# Patient Record
Sex: FEMALE | Race: WHITE | Employment: FULL TIME | URBAN - METROPOLITAN AREA
[De-identification: names, ages, dates, MRNs, and addresses within clinical notes are randomized per-mention and may not be internally consistent; named-entity substitution may affect disease eponyms.]

---

## 2017-05-23 ENCOUNTER — NURSE ONLY (OUTPATIENT)
Dept: FAMILY MEDICINE CLINIC | Age: 57
End: 2017-05-23

## 2017-05-23 DIAGNOSIS — E78.5 HYPERLIPIDEMIA, UNSPECIFIED HYPERLIPIDEMIA TYPE: ICD-10-CM

## 2017-05-23 LAB
ALBUMIN SERPL-MCNC: 4.6 G/DL (ref 3.4–5)
ALP BLD-CCNC: 68 U/L (ref 40–129)
ALT SERPL-CCNC: 33 U/L (ref 10–40)
AST SERPL-CCNC: 33 U/L (ref 15–37)
BILIRUB SERPL-MCNC: 1.8 MG/DL (ref 0–1)
BILIRUBIN DIRECT: 0.3 MG/DL (ref 0–0.3)
BILIRUBIN, INDIRECT: 1.5 MG/DL (ref 0–1)
CHOLESTEROL, TOTAL: 180 MG/DL (ref 0–199)
HDLC SERPL-MCNC: 60 MG/DL (ref 40–60)
LDL CHOLESTEROL CALCULATED: 103 MG/DL
TOTAL PROTEIN: 7.3 G/DL (ref 6.4–8.2)
TRIGL SERPL-MCNC: 83 MG/DL (ref 0–150)
VLDLC SERPL CALC-MCNC: 17 MG/DL

## 2017-05-23 PROCEDURE — 36415 COLL VENOUS BLD VENIPUNCTURE: CPT | Performed by: FAMILY MEDICINE

## 2017-05-24 ENCOUNTER — OFFICE VISIT (OUTPATIENT)
Dept: FAMILY MEDICINE CLINIC | Age: 57
End: 2017-05-24

## 2017-05-24 VITALS
HEART RATE: 113 BPM | WEIGHT: 112.2 LBS | DIASTOLIC BLOOD PRESSURE: 60 MMHG | OXYGEN SATURATION: 97 % | SYSTOLIC BLOOD PRESSURE: 110 MMHG | BODY MASS INDEX: 20.19 KG/M2

## 2017-05-24 DIAGNOSIS — E78.5 HYPERLIPIDEMIA, UNSPECIFIED HYPERLIPIDEMIA TYPE: Primary | ICD-10-CM

## 2017-05-24 PROCEDURE — 99213 OFFICE O/P EST LOW 20 MIN: CPT | Performed by: FAMILY MEDICINE

## 2017-05-24 RX ORDER — ATORVASTATIN CALCIUM 20 MG/1
20 TABLET, FILM COATED ORAL DAILY
Qty: 30 TABLET | Refills: 4 | Status: SHIPPED | OUTPATIENT
Start: 2017-05-24 | End: 2018-07-02 | Stop reason: SDUPTHER

## 2017-05-24 RX ORDER — ATORVASTATIN CALCIUM 20 MG/1
20 TABLET, FILM COATED ORAL DAILY
Qty: 30 TABLET | Refills: 0 | Status: SHIPPED | OUTPATIENT
Start: 2017-05-24 | End: 2017-05-24 | Stop reason: SDUPTHER

## 2017-10-02 ENCOUNTER — TELEPHONE (OUTPATIENT)
Dept: FAMILY MEDICINE CLINIC | Age: 57
End: 2017-10-02

## 2017-10-09 ENCOUNTER — OFFICE VISIT (OUTPATIENT)
Dept: FAMILY MEDICINE CLINIC | Age: 57
End: 2017-10-09

## 2017-10-09 VITALS
SYSTOLIC BLOOD PRESSURE: 110 MMHG | DIASTOLIC BLOOD PRESSURE: 70 MMHG | HEIGHT: 62 IN | TEMPERATURE: 98 F | WEIGHT: 110 LBS | BODY MASS INDEX: 20.24 KG/M2

## 2017-10-09 DIAGNOSIS — R10.13 EPIGASTRIC PAIN: Primary | ICD-10-CM

## 2017-10-09 DIAGNOSIS — F41.9 ANXIETY: ICD-10-CM

## 2017-10-09 PROCEDURE — 99214 OFFICE O/P EST MOD 30 MIN: CPT | Performed by: FAMILY MEDICINE

## 2017-10-09 RX ORDER — SERTRALINE HYDROCHLORIDE 25 MG/1
25 TABLET, FILM COATED ORAL DAILY
Qty: 30 TABLET | Refills: 3 | Status: SHIPPED | OUTPATIENT
Start: 2017-10-09 | End: 2018-07-11 | Stop reason: SDUPTHER

## 2017-10-09 RX ORDER — PANTOPRAZOLE SODIUM 40 MG/1
TABLET, DELAYED RELEASE ORAL
COMMUNITY
Start: 2017-09-27 | End: 2017-12-20

## 2017-10-09 ASSESSMENT — ENCOUNTER SYMPTOMS
NAUSEA: 0
DIARRHEA: 0
ANAL BLEEDING: 0
BLOOD IN STOOL: 0
VOMITING: 0
ABDOMINAL PAIN: 1
CONSTIPATION: 1

## 2017-10-09 NOTE — PROGRESS NOTES
supple. Cardiovascular: Normal rate, regular rhythm and normal heart sounds. Pulmonary/Chest: Effort normal and breath sounds normal. She has no wheezes. She has no rales. Abdominal: Soft. Bowel sounds are normal. She exhibits no distension and no mass. There is tenderness. There is no rebound and no guarding. Psychiatric: Her speech is normal and behavior is normal. Judgment and thought content normal. She exhibits a depressed mood. Vitals reviewed. Assessment:      1. Epigastric pain     2. Anxiety             Plan:      We'll start patient on Zoloft 25 mg. She is concerned that she is very sensitive to medications and does not want a large dose. She will break the pill in half her dose of 12.5 mg a day she will take it in the morning with breakfast. She will call in 2 weeks with an update in follow-up in one month. We will also schedule her to meet with our behavioral health counselor. I do not think we need to repeat EGT. The PPIs did not think for her pain and she had a completely normal EGT. So we'll stop all the GI medicines and see how her pain responds to the antianxiety medicine. I spent a total of 25* minutes face to face with this patient, over 50% of that time was spent on counseling and care coordination. Please see assessment and plan for counseling and care coordination details. 

## 2017-10-11 NOTE — TELEPHONE ENCOUNTER
Lizette Gonzalez is requesting refill(s)   Last OV 10-9-17 (pertaining to medication)  LR 1-18-17 (per medication requested)  Next office visit scheduled or attempted Yes   If no, reason:  11-15-17

## 2017-10-18 ENCOUNTER — OFFICE VISIT (OUTPATIENT)
Dept: PSYCHOLOGY | Age: 57
End: 2017-10-18

## 2017-10-18 DIAGNOSIS — F41.9 ANXIETY: Primary | ICD-10-CM

## 2017-10-18 PROCEDURE — 90791 PSYCH DIAGNOSTIC EVALUATION: CPT | Performed by: SOCIAL WORKER

## 2017-10-18 ASSESSMENT — PATIENT HEALTH QUESTIONNAIRE - PHQ9
5. POOR APPETITE OR OVEREATING: 2
2. FEELING DOWN, DEPRESSED OR HOPELESS: 1
4. FEELING TIRED OR HAVING LITTLE ENERGY: 1
SUM OF ALL RESPONSES TO PHQ QUESTIONS 1-9: 6
3. TROUBLE FALLING OR STAYING ASLEEP: 1
10. IF YOU CHECKED OFF ANY PROBLEMS, HOW DIFFICULT HAVE THESE PROBLEMS MADE IT FOR YOU TO DO YOUR WORK, TAKE CARE OF THINGS AT HOME, OR GET ALONG WITH OTHER PEOPLE: 1
6. FEELING BAD ABOUT YOURSELF - OR THAT YOU ARE A FAILURE OR HAVE LET YOURSELF OR YOUR FAMILY DOWN: 0
9. THOUGHTS THAT YOU WOULD BE BETTER OFF DEAD, OR OF HURTING YOURSELF: 0
7. TROUBLE CONCENTRATING ON THINGS, SUCH AS READING THE NEWSPAPER OR WATCHING TELEVISION: 0
1. LITTLE INTEREST OR PLEASURE IN DOING THINGS: 1
SUM OF ALL RESPONSES TO PHQ9 QUESTIONS 1 & 2: 2
8. MOVING OR SPEAKING SO SLOWLY THAT OTHER PEOPLE COULD HAVE NOTICED. OR THE OPPOSITE, BEING SO FIGETY OR RESTLESS THAT YOU HAVE BEEN MOVING AROUND A LOT MORE THAN USUAL: 0

## 2017-10-18 NOTE — PATIENT INSTRUCTIONS
1.  Keep taking your zoloft regularly  2. Review handouts on fight or flight and diaphragmatic breathing  3. Practice diaphragmatic breathing at least 2-3 times a day and anytime you are symptomatic  4. Next appt we will talk more about grief  5. Try the yoga  6. Return to see Kraen Schafer in 2 weeks. The Stress Response and How It Can Affect You   The stress response, or fight or flight response is the emergency reaction system of the body. It is there to keep you safe in emergencies. The stress response includes physical and thought responses to your perception of various situations. When the stress response is turned on, your body may release substances like adrenaline and cortisol. Your organs are programmed to respond in certain ways to situations that are viewed as challenging or threatening. The stress response can work against you. You can turn it on when you dont really need it and, as a result, perceive something as an emergency when its really not. It can turn on when you are just thinking about past or future events. Harmless, chronic conditions can be intensified by the stress response activating too often, with too much intensity, or for too long. Stress responses can be different for different individuals. Below is a list of some common stress related responses people have. ([place] the responses you have had in the last 2 weeks.)     Physical Responses   Muscle aches   Insomnia   ? Heart rate   Headache   Weight gain   Nausea   Constipation   Dry mouth   Muscle twitching  Weight loss   Low energy   Weakness   Tight chest   Diarrhea   Dizziness   Trembling   Stomach cramps  Chills    Hot flashes   Sweating   Pounding heart  Choking feeling  Chest pain   Leg cramps   Numb hands/feet Dry throat   Appetite change  Face flushing   ? Blood pressure  Light-headedness  Feeling faint      Troubleswallowing   Rash ?    Urination   Neck pain     Tingling hands/feet     Emotional and Thought Responses muscle tension, and overall feelings of relaxation. Why Be Concerned With How Im Breathing?  To increase your awareness of the role that breathing plays in increased physical tension and in contributing to increasing your bodys stress response.  To lower your level of stress-related arousal and tension.  To give you a method of taking calm, relaxing breaths to break the cycle of increasing arousal during stressful situations. What Is the Best Way To Use Deep Breathing Exercises?  Use deep breathing frequently.  Take deep breaths at the first signs of stress, anxiety, physical tension, or other symptoms.  Schedule time for relaxation. Relaxation:  Diaphragmatic Breathing             ______________________________________________________________________________    1. Sit in a comfortable position    2. Place one hand on your stomach and the other on your chest    3. Try to breathe so that only your stomach rises and falls    As you inhale, concentrate on your chest remaining relatively still while your stomach rises. It may be helpful for you to imagine that your pants are too big and you need to push your stomach out to hold them up. When exhaling, allow your stomach to fall in and the air to fully escape. Inhale slowly. You may choose to hold the air in for about a second. Exhale slowly. Dont push the air out, but just let the natural pressure of your body slowly move it out. It is normal for this healthy method of breathing to feel a little awkward at first.  With practice, it will feel more natural.    4. Get your mind on your side    One other important factor in getting relaxed is your mind. Your mind and body are connected. The mind influences the body and the body influences the mind. What you do with your mind when you are trying to relax is very important. The key is to avoid thinking about stressful things.       You can think about      Neutral things

## 2017-10-18 NOTE — PROGRESS NOTES
Behavioral Health Consultation  [facility]  10/18/2017  4:09 PM      Time spent with Patient: 30 minutes  [facility] Reason for Consult:  anxiety  Referring Provider: Jina Fuller MD  [address] [place] [address], [facility] [facility]    Pt provided informed consent for the behavioral health program. Discussed with patient model of service to include the limits of confidentiality (i.e. abuse reporting, suicide intervention, etc.) and short-term intervention focused approach. Pt indicated understanding. Feedback given to PCP. S:  Pt reports that she has been feeling physically ill, however doctor thinks that this is due to stress. Brother  on 17 of cancer,  very suddenly. He left a 13 yr old daughter behind. He had sole custody of her. Pt and brother were not super close, however she is grieving, as are the rest of the family. Pt got a new job today with [employer], she is very excited to have more normal hours. She is currently a . Pt has started having some health anxiety, thinking she has cancer. She had some tests done at [facility], all is came out clear. Pt's nephew who is 24 also is an alcoholic so struggling with this. Pt falls asleep well and sleeps through the night for the most part. Occasionally, will have some anxiety when work is stressful.  31 years,  and she sometimes has there days. Their kids are doing well overall. Weight loss, but very low appetite due to stress. No SI/HI.      O:    MSE:    Appearance    alert, cooperative  Appetite abnormal: low  Sleep disturbance No  Fatigue Yes  Loss of pleasure Yes  Impulsive behavior No  Speech    spontaneous, normal volume and rapid  Mood    Anxious  Affect    anxiety  Thought Content    intact  Thought Process    linear, goal directed and coherent  Associations    logical connections  Insight    Good  Judgment    Intact  Orientation oriented to person, place, time, and general circumstances  Memory    recent and remote memory intact  Attention/Concentration    intact  Morbid ideation No  Suicide Assessment    no suicidal ideation      History:    Medications:   Current Outpatient Prescriptions   Medication Sig Dispense Refill    hydrocortisone-pramoxine (ANALPRAM-HC) 2.5-1 % rectal cream PLACE RECTALLY TWO TIMES A DAY 60 g 0    pantoprazole (PROTONIX) 40 MG tablet       sertraline (ZOLOFT) 25 MG tablet Take 1 tablet by mouth daily 30 tablet 3    atorvastatin (LIPITOR) 20 MG tablet Take 1 tablet by mouth daily 30 tablet 4    pantoprazole sodium (PROTONIX) 40 MG PACK packet Take 40 mg by mouth every morning (before breakfast)      estradiol (ESTRACE VAGINAL) 0.1 MG/GM vaginal cream Use 1 g daily for 2 weeks, then 1 g twice a week 1 Tube 3    desoximetasone (TOPICORT) 0.25 % cream Apply topically 2 times daily, use sparingly 30 g 1     No current facility-administered medications for this visit. Social History:   Social History     Social History    Marital status:      Spouse name: N/A    Number of children: 2    Years of education: N/A     Occupational History    realtor      Social History Main Topics    Smoking status: Never Smoker    Smokeless tobacco: Never Used    Alcohol use Yes      Comment: Occasionally    Drug use: Unknown    Sexual activity: Not on file     Other Topics Concern    Not on file     Social History Narrative    Healthy diet, not exercising       TOBACCO:   reports that she has never smoked. She has never used smokeless tobacco.  ETOH:   reports that she drinks alcohol.     Family History:   Family History   Problem Relation Age of Onset   [employer] Stroke Mother     Heart Disease Mother     Cancer Father      brain    Dementia Father     Diabetes Other     Other Sister      hypothyroid    Heart Disease Maternal Grandfather     Diabetes Paternal Grandmother     Heart Disease Paternal

## 2017-10-23 ENCOUNTER — OFFICE VISIT (OUTPATIENT)
Dept: FAMILY MEDICINE CLINIC | Age: 57
End: 2017-10-23

## 2017-10-23 VITALS
OXYGEN SATURATION: 98 % | DIASTOLIC BLOOD PRESSURE: 70 MMHG | HEIGHT: 62 IN | BODY MASS INDEX: 20.46 KG/M2 | TEMPERATURE: 98.5 F | HEART RATE: 88 BPM | WEIGHT: 111.2 LBS | SYSTOLIC BLOOD PRESSURE: 118 MMHG

## 2017-10-23 DIAGNOSIS — R10.31 RIGHT LOWER QUADRANT ABDOMINAL PAIN: Primary | ICD-10-CM

## 2017-10-23 DIAGNOSIS — F41.9 ANXIETY: ICD-10-CM

## 2017-10-23 PROCEDURE — 99214 OFFICE O/P EST MOD 30 MIN: CPT | Performed by: FAMILY MEDICINE

## 2017-10-23 ASSESSMENT — ENCOUNTER SYMPTOMS
ABDOMINAL PAIN: 1
CONSTIPATION: 1
DIARRHEA: 0

## 2017-10-23 NOTE — PROGRESS NOTES
Subjective:      Patient ID: Russell Adamson is a 62 y.o. female. Patient was seen 3 weeks ago for an epigastric pain. This was thought to be functional. She is now on Zoloft 12.5 mg. This is for anxiety. Noticed that her epigastric pain has improved slightly at still happens but is not as severe. 3 weeks ago she started with this new right lower quadrant pain fairly sharp and occurs daily. She is chronic constipation otherwise no new symptoms. She had lab work done in May 2017. This was reviewed      Abdominal Pain   This is a new problem. Episode onset: 3 weeks. The onset quality is sudden. The problem occurs daily. The problem has been gradually worsening. The pain is located in the RLQ. The pain is at a severity of 5/10. The quality of the pain is aching. The abdominal pain does not radiate. Associated symptoms include constipation. Pertinent negatives include no diarrhea or fever. Nothing aggravates the pain. The pain is relieved by nothing. Prior diagnostic workup includes ultrasound and upper endoscopy. Review of Systems   Constitutional: Negative for fever and unexpected weight change. Gastrointestinal: Positive for abdominal pain and constipation. Negative for diarrhea. Genitourinary: Negative for difficulty urinating and menstrual problem. Objective:   Physical Exam   Constitutional: No distress. Eyes: Conjunctivae are normal.   Neck: Neck supple. Cardiovascular: Normal rate, regular rhythm and normal heart sounds. Pulmonary/Chest: Breath sounds normal. She has no wheezes. She has no rales. Abdominal: Soft. Bowel sounds are normal. She exhibits no distension and no mass. There is no rebound and no guarding. Diffusely tender mainly in the right lower and mid quadrant areas   Musculoskeletal: She exhibits no edema. Neurological: She is alert. Psychiatric: She has a normal mood and affect. Vitals reviewed. Assessment:      1.  Right lower quadrant abdominal pain  CT ABDOMEN PELVIS W WO IV CONTRAST Additional Contrast? Radiologist Recommendation   2. Anxiety             Plan:      As above. No medications or lab work are needed at this time. However we need to further assess her anatomy. If no abnormalities are found, then we can work from a functional abdominal pain perspective.  She also will increase the dose of her Zoloft to 25 mg and continued to see the behavioral health consultant

## 2017-11-15 ENCOUNTER — OFFICE VISIT (OUTPATIENT)
Dept: PSYCHOLOGY | Age: 57
End: 2017-11-15

## 2017-11-15 DIAGNOSIS — F41.9 ANXIETY: Primary | ICD-10-CM

## 2017-11-15 PROCEDURE — 90832 PSYTX W PT 30 MINUTES: CPT | Performed by: SOCIAL WORKER

## 2017-11-15 ASSESSMENT — PATIENT HEALTH QUESTIONNAIRE - PHQ9
SUM OF ALL RESPONSES TO PHQ9 QUESTIONS 1 & 2: 0
1. LITTLE INTEREST OR PLEASURE IN DOING THINGS: 0
7. TROUBLE CONCENTRATING ON THINGS, SUCH AS READING THE NEWSPAPER OR WATCHING TELEVISION: 0
SUM OF ALL RESPONSES TO PHQ QUESTIONS 1-9: 2
9. THOUGHTS THAT YOU WOULD BE BETTER OFF DEAD, OR OF HURTING YOURSELF: 0
2. FEELING DOWN, DEPRESSED OR HOPELESS: 0
5. POOR APPETITE OR OVEREATING: 1
10. IF YOU CHECKED OFF ANY PROBLEMS, HOW DIFFICULT HAVE THESE PROBLEMS MADE IT FOR YOU TO DO YOUR WORK, TAKE CARE OF THINGS AT HOME, OR GET ALONG WITH OTHER PEOPLE: 1
4. FEELING TIRED OR HAVING LITTLE ENERGY: 0
6. FEELING BAD ABOUT YOURSELF - OR THAT YOU ARE A FAILURE OR HAVE LET YOURSELF OR YOUR FAMILY DOWN: 0
8. MOVING OR SPEAKING SO SLOWLY THAT OTHER PEOPLE COULD HAVE NOTICED. OR THE OPPOSITE, BEING SO FIGETY OR RESTLESS THAT YOU HAVE BEEN MOVING AROUND A LOT MORE THAN USUAL: 0
3. TROUBLE FALLING OR STAYING ASLEEP: 1

## 2017-11-15 NOTE — PATIENT INSTRUCTIONS
death, birthdays, and holidays. The Stages of Grief  Grief therapists often describe stages of grief outlined by the research of Dr. Dov Dixon. These stages do not always go in order. You may move back and forth among some of the stages and may even skip some of them. These stages are meant as a guide to help you understand your reactions and those of others who are grieving.  - Denial:  Denial (not acknowledging the loss) can help contain the shock of loss. Denial can act as a safety mechanism to block out grief until we are ready to handle it. - Sadness and depression:  Deep, intense grief and mourning appear during this stage. When the full understanding of our loss comes, it can seem overwhelming. During this stage, you may cry often and unexpectedly. You may not want to be around people or to do things that you normally enjoy. During this stage, it is best to remain as active as possible and to seek supportive people who will allow you to say what you need to or to cry when you need to. It is important to allow yourself to work through your full range and experience of emotions. - Anger:  Rage and anger can be intense toward the person who , toward friends and relatives, and even toward God. It is important to have an outlet to release anger through activities such as exercise, hobbies, or through therapy. Guilt, shame, and blame are feelings that need to be addressed, especially if it is toward you. - Acceptance: This stage includes coming to terms with the loss. It does not mean that you have found the answers to your questions or that you stop thinking about the person who is gone. It does signify a reinvestment in life and a willingness to readjust to your new circumstances while carrying the memory of your loved one with you. How to Help Yourself  1. Give yourself time to grieve.   It is normal and important to express your grief and to work through the concerns that arise for you at this time. Stuffing your feelings may not be helpful and may delay or prolong your grief. 2. Find supportive people to reach out to during your grief. This is the time when the support of others may be the most helpful. Dont be afraid to tell them how they can best help, even if it means just listening. It is often very helpful to talk about your loss with people who will allow you to express your emotions. 3. Take care of your health. Often after a loss, we stop doing the things we need to for health care, such as exercising, eating correctly, keeping Dr. appointments, or taking prescribed medications. If you are on a health care regimen, it is important to continue to adhere to your treatment. 4. Postpone major life changes. Give yourself time to adjust to your loss before making plans to change jobs, move or sell your home, remarry, etc.  Grief can sometimes cloud your judgment and ability to make decisions. 5. Consider keeping a journal.  It is often helpful to write or tell the story of your loss and what it means to you as a way to work through your feelings. 6. Participate in activities. Staying active through exercise, enjoyable activities, outings with supportive others, or even starting new hobbies can help us get through tough times while providing opportunities for constructive development and use of energy. 7. Find a way to memorialize your loved one. Planting a tree or garden in the name of your loved one, dedicating a work to their memory, contributing to a eve in their name, and other such activities can be helpful. 8. Consider joining grief-support groups or contacting a grief counselor for additional support and help. Remember that depressive symptoms (feeling sad) are a fundamental part of normal bereavement. Staying active and finding support from others can help you to work through the grief process.     References  Janis Moore,  Cindy Albarado 89, Sravan Melvin T, Reminisce as you do so. 6. Recall your dreams. Your dreams often have important things to say about your feelings and about your relationship with the one who . Your dreams may be scary or sad, especially early on. They may seem weird or crazy to you. You may find that your loved one appears in your dreams. Accept your dreams for what they are and see what you can learn from them. No one knows that better than you. 7. Tell people what helps you and what doesnt. People around you may not understand what you need. So tell them. If hearing your loved ones name spoken aloud by others feels good, say so. If you need more time alone, or assistance with chores youre unable to complete, or an occasional hug, be honest.  People cant read your mind, so youll have to speak it. 8. Write things down. Most people who are grieving become more forgetful than usual.  So help yourself remember what you want by keeping track of it on paper or with whatever system works best for you. This may include writing down things you want to preserve about the person who has . 9. Ask for a copy of the Tomasa's. If the  liturgy or memorial service holds special meaning for you because of what was spoken or read, ask for the words. Whoever participated in that ritual will feel gratified that what they prepared was appreciated. Turn to these words whenever you want. Some people find these thoughts provide even more help weeks and months after the service. 10. Remember the serenity prayer. This prayer is attributed to theologian Blaise Merchant, but its actually an ancient  Regulo Brem[place] Mikhail. It has brought comfort and support to many that have suffered various kinds of afflictions. 11. Create a memory area at home.   In a space that feels appropriate, arrange a small table that honors the person: a framed photograph or two, perhaps a prized possession or award or something they created or something they loved. This might be placed on a small table, a mantel or a desk. Some people like to use a grouping of candles, representing not just the person who  but others who have  as well. In that case a variety of candles can be arranged each representing a unique life. 12. Drink water. Grieving people can easily become dehydrated. Crying can naturally lead to that. And with your normal routines turned upside down, you may simply not drink as much or as regularly as you did before this death. Make this a way you care for yourself. 13. Use your hands. Sometimes theres value in doing repetitive things with your hands, something you dont have to think about very much because it becomes second nature. Knitting and crocheting are like that. So are carving, woodworking, polishing, solving jigsaw puzzles, painting, braiding, shoveling, washing and countless other activities. 15. Give yourself respites from your grief. Just because youre grieving doesnt mean you must always be feeling sad or forlorn. Theres value in sometimes consciously deciding that youll think about something else for awhile, or that youll do something youve always enjoyed doing. Sometimes this happens naturally and its only later you realize that your grief has taken a back seat. Let it, this is not an indication you love that person any less or that youre forgetting them. Its a sign that youre human and you need relief from the unrelenting pressure. It can also be a healthy sign youre healing. 15. See a grief counselor. If youre concerned about how youre feeling and how well youre adapting make an appointment   with a counselor who specializes in grief. Often youll learn what you need both about grief and about yourself as a griever in only a few sessions. Ask questions of the counselor before you sign on. What specific training does he or she have? What accreditation?   A person who is a Program for Moving Beyond Death, Divorce, & Other Losses by Ally PASTRANA Grief Observed by Josefa Xie  by Damaso Mancilla When Good-Bye Is Forever by Scottie Oneill and Grief by Vito Tobias or Stas Rue Emanuel Davis for a Son. There are many others. Check with a . 22. Learn about your loved one from others. Listen to the stories others have to tell about the one, who , stories youre familiar with and those youve never heard before. Spend time with their friends, schoolmates or colleagues. Invite them into your home. Solicit the writings of others. Preserve whatever you find out. Celebrate your time together. 26. Take a day off. When the mood is just right, take a one-day vacation. Do whatever you want, or dont do whatever you want. Travel somewhere or stay inside by yourself. Be very active or dont do anything at all. Just make it your day, whatever that means for you. 32. Invite someone to give you feedback. Select someone you trust, preferably someone familiar with the working of grief, to give you his or her reaction when you ask for it. If you want to check out how clearly youre thinking, how accurately youre remembering, how effectively youre coping, go to that person. Pose your questions, then listen to their responses. What you choose to do with that information will be up to you. 28. Vent your anger rather than hold it in. You may feel awkward being angry when youre grieving, but anger is a common reaction. The expression holds true: anger is best out floating rather than in bloating. Even if you feel a bit ashamed as you do it, find ways to get it out of your system. [place], even if its in an empty house. Cry. Hit something soft. Throw eggs at something hard. Vacuum up a storm. Resist the temptation to be proper. 29. Give thanks every day.   Whatever has happened to you, you still have things to be thankful for.  Perhaps its your memories, your remaining family, your support, your work; you own health  all sorts of things. Draw your attention to those parts of life that are worth appreciating, then appreciate them. 30. Monitor signs of dependency. While its normal to become more dependent upon others for awhile immediately after a death, it will not be helpful to continue in that role long-term. Watch for signs that youre prolonging your need for assistance. Congratulate yourself when you do things for yourself.

## 2017-11-15 NOTE — PROGRESS NOTES
Behavioral Health Consultation  Gray Ragsdale. [address]  11/15/2017  4:32 PM      Time spent with Patient: 30 minutes  This is patient's second  [facility] appointment. Reason for Consult:  anxiety  Referring Provider: Katt Hendrickson MD  [address] [place] [address], [address] [facility]    Pt provided informed consent for the behavioral health program. Discussed with patient model of service to include the limits of confidentiality (i.e. abuse reporting, suicide intervention, etc.) and short-term intervention focused approach. Pt indicated understanding. Feedback given to PCP. S:  Pt reports mood has been a lot better. She is eating less sugar, less white bread. Stomach is feeling better. Transitioning to new job went really well. Sunday went out with a friend and talked about a pain she was having in he lower stomach and thought it was something bad. They discussed this and determined with might just be overy pain, and this made her feel better. Things with her niece have had some stressful moments but she is handling them well. Really working to improve her overall self care. Looking to join yoga and get back to the gym, sees the benefit to improving self care.         O:    MSE:    Appearance    alert, cooperative  Appetite normal  Sleep disturbance Yes  Fatigue No  Loss of pleasure No  Impulsive behavior No  Speech    spontaneous, normal rate and normal volume  Mood    euthymic  Affect    normal affect  Thought Content    intact  Thought Process    linear, goal directed and coherent  Associations    logical connections  Insight    Good  Judgment    Intact  Orientation    oriented to person, place, time, and general circumstances  Memory    recent and remote memory intact  Attention/Concentration    intact  Morbid ideation No  Suicide Assessment    no suicidal ideation      History:    Medications:   Current Outpatient Prescriptions   Medication Sig Dispense Refill    hydrocortisone-pramoxine (ANALPRAM-HC) 2.5-1 % rectal cream PLACE RECTALLY TWO TIMES A DAY 60 g 0    pantoprazole (PROTONIX) 40 MG tablet       sertraline (ZOLOFT) 25 MG tablet Take 1 tablet by mouth daily 30 tablet 3    atorvastatin (LIPITOR) 20 MG tablet Take 1 tablet by mouth daily 30 tablet 4    pantoprazole sodium (PROTONIX) 40 MG PACK packet Take 40 mg by mouth every morning (before breakfast)      estradiol (ESTRACE VAGINAL) 0.1 MG/GM vaginal cream Use 1 g daily for 2 weeks, then 1 g twice a week 1 Tube 3    desoximetasone (TOPICORT) 0.25 % cream Apply topically 2 times daily, use sparingly 30 g 1     No current facility-administered medications for this visit. Social History:   Social History     Social History    Marital status:      Spouse name: N/A    Number of children: 2    Years of education: N/A     Occupational History    realtor      Social History Main Topics    Smoking status: Never Smoker    Smokeless tobacco: Never Used    Alcohol use Yes      Comment: Occasionally    Drug use: Unknown    Sexual activity: Not on file     Other Topics Concern    Not on file     Social History Narrative    Healthy diet, not exercising       TOBACCO:   reports that she has never smoked. She has never used smokeless tobacco.  ETOH:   reports that she drinks alcohol.     Family History:   Family History   Problem Relation Age of Onset   Georganna Duverney Stroke Mother     Heart Disease Mother     Cancer Father      brain    Dementia Father     Diabetes Other     Other Sister      hypothyroid    Heart Disease Maternal Grandfather     Diabetes Paternal Grandmother     Heart Disease Paternal Grandmother      PHQ Scores 11/15/2017 10/18/2017   PHQ2 Score 0 2   PHQ9 Score 2 6     Interpretation of Total Score Depression Severity: 1-4 = Minimal depression, 5-9 = Mild depression, 10-14 = Moderate depression, 15-19 = Moderately severe depression, 20-27 = Severe depression      A:  Pt endorses significant improvement in mood since last appt. Improving self care. No SI/HI. Insight and motivation good. Diagnosis: Anxiety disorder Not Otherwise Specified  Problems with primary support group    Past Diagnosis:      Diagnosis Date    Allergic rhinitis     Eczema     GERD (gastroesophageal reflux disease)     Migraine            Plan:  Pt interventions:  Provided handout on  grief, Trained in strategies for increasing balanced thinking, Discussed and set plan for behavioral activation, Trained in relaxation strategies, Motivational Interviewing to increase patient confidence and compliance with adhering to behavioral change plan, Motivational Interviewing to determine importance and readiness for change, Supportive techniques and Emphasized self-care as important for managing overall health      Pt Behavioral Change Plan:  1. Continue the communication with your niece  2. Review the handout on grief and feel free share with your niece   3. Continue with your breathing, try to do it every day  4. Set a goal of getting exercising  5. Try Dr. Anthony sanchez, ked[place], yogurt, taqueria   6.  Return to see Bolivar Stable as needed

## 2017-12-20 ENCOUNTER — OFFICE VISIT (OUTPATIENT)
Dept: FAMILY MEDICINE CLINIC | Age: 57
End: 2017-12-20

## 2017-12-20 VITALS
DIASTOLIC BLOOD PRESSURE: 64 MMHG | HEIGHT: 62 IN | WEIGHT: 112 LBS | SYSTOLIC BLOOD PRESSURE: 90 MMHG | BODY MASS INDEX: 20.61 KG/M2

## 2017-12-20 DIAGNOSIS — E78.5 HYPERLIPIDEMIA, UNSPECIFIED HYPERLIPIDEMIA TYPE: ICD-10-CM

## 2017-12-20 DIAGNOSIS — R10.31 PAIN, ABDOMINAL, RLQ: ICD-10-CM

## 2017-12-20 DIAGNOSIS — F41.9 ANXIETY: ICD-10-CM

## 2017-12-20 DIAGNOSIS — Z00.00 ROUTINE GENERAL MEDICAL EXAMINATION AT A HEALTH CARE FACILITY: Primary | ICD-10-CM

## 2017-12-20 LAB
A/G RATIO: 2 (ref 1.1–2.2)
ALBUMIN SERPL-MCNC: 4.9 G/DL (ref 3.4–5)
ALP BLD-CCNC: 57 U/L (ref 40–129)
ALT SERPL-CCNC: 31 U/L (ref 10–40)
ANION GAP SERPL CALCULATED.3IONS-SCNC: 15 MMOL/L (ref 3–16)
AST SERPL-CCNC: 30 U/L (ref 15–37)
BASOPHILS ABSOLUTE: 0 K/UL (ref 0–0.2)
BASOPHILS RELATIVE PERCENT: 0.6 %
BILIRUB SERPL-MCNC: 1.3 MG/DL (ref 0–1)
BUN BLDV-MCNC: 12 MG/DL (ref 7–20)
CALCIUM SERPL-MCNC: 10 MG/DL (ref 8.3–10.6)
CHLORIDE BLD-SCNC: 103 MMOL/L (ref 99–110)
CHOLESTEROL, TOTAL: 195 MG/DL (ref 0–199)
CO2: 28 MMOL/L (ref 21–32)
CREAT SERPL-MCNC: 0.5 MG/DL (ref 0.6–1.1)
EOSINOPHILS ABSOLUTE: 0.1 K/UL (ref 0–0.6)
EOSINOPHILS RELATIVE PERCENT: 1.8 %
GFR AFRICAN AMERICAN: >60
GFR NON-AFRICAN AMERICAN: >60
GLOBULIN: 2.4 G/DL
GLUCOSE BLD-MCNC: 82 MG/DL (ref 70–99)
HCT VFR BLD CALC: 41.2 % (ref 36–48)
HDLC SERPL-MCNC: 76 MG/DL (ref 40–60)
HEMOGLOBIN: 13.9 G/DL (ref 12–16)
LDL CHOLESTEROL CALCULATED: 105 MG/DL
LYMPHOCYTES ABSOLUTE: 1.3 K/UL (ref 1–5.1)
LYMPHOCYTES RELATIVE PERCENT: 33.9 %
MCH RBC QN AUTO: 30.8 PG (ref 26–34)
MCHC RBC AUTO-ENTMCNC: 33.7 G/DL (ref 31–36)
MCV RBC AUTO: 91.2 FL (ref 80–100)
MONOCYTES ABSOLUTE: 0.4 K/UL (ref 0–1.3)
MONOCYTES RELATIVE PERCENT: 9.6 %
NEUTROPHILS ABSOLUTE: 2.1 K/UL (ref 1.7–7.7)
NEUTROPHILS RELATIVE PERCENT: 54.1 %
PDW BLD-RTO: 13.8 % (ref 12.4–15.4)
PLATELET # BLD: 263 K/UL (ref 135–450)
PMV BLD AUTO: 8.6 FL (ref 5–10.5)
POTASSIUM SERPL-SCNC: 4.8 MMOL/L (ref 3.5–5.1)
RBC # BLD: 4.51 M/UL (ref 4–5.2)
SODIUM BLD-SCNC: 146 MMOL/L (ref 136–145)
TOTAL PROTEIN: 7.3 G/DL (ref 6.4–8.2)
TRIGL SERPL-MCNC: 71 MG/DL (ref 0–150)
VLDLC SERPL CALC-MCNC: 14 MG/DL
WBC # BLD: 3.9 K/UL (ref 4–11)

## 2017-12-20 PROCEDURE — 36415 COLL VENOUS BLD VENIPUNCTURE: CPT | Performed by: FAMILY MEDICINE

## 2017-12-20 PROCEDURE — 99396 PREV VISIT EST AGE 40-64: CPT | Performed by: FAMILY MEDICINE

## 2017-12-20 NOTE — PROGRESS NOTES
2. 5-1 % rectal cream PLACE RECTALLY TWO TIMES A DAY 60 g 0    sertraline (ZOLOFT) 25 MG tablet Take 1 tablet by mouth daily 30 tablet 3    atorvastatin (LIPITOR) 20 MG tablet Take 1 tablet by mouth daily 30 tablet 4    desoximetasone (TOPICORT) 0.25 % cream Apply topically 2 times daily, use sparingly 30 g 1    pantoprazole sodium (PROTONIX) 40 MG PACK packet Take 40 mg by mouth every morning (before breakfast)       No current facility-administered medications for this visit. Past Medical History:   Diagnosis Date    Allergic rhinitis     Eczema     GERD (gastroesophageal reflux disease)     Migraine      Past Surgical History:   Procedure Laterality Date    SINUS SURGERY  2003, 2010    x2     Family History   Problem Relation Age of Onset    Stroke Mother     Heart Disease Mother     Cancer Father      brain    Dementia Father     Diabetes Other     Other Sister      hypothyroid    Heart Disease Maternal Grandfather     Diabetes Paternal Grandmother     Heart Disease Paternal Grandmother      Social History     Social History    Marital status:      Spouse name: N/A    Number of children: 2    Years of education: N/A     Occupational History    realtor      Social History Main Topics    Smoking status: Never Smoker    Smokeless tobacco: Never Used    Alcohol use Yes      Comment: Occasionally    Drug use: Unknown    Sexual activity: Not on file     Other Topics Concern    Not on file     Social History Narrative    Healthy diet, not exercising       Review of Systems:  A comprehensive review of systems was negative except for what was noted in the HPI. Physical Exam:   Vitals:    12/20/17 0904   BP: 90/64   Weight: 112 lb (50.8 kg)   Height: 5' 2\" (1.575 m)     Body mass index is 20.49 kg/m². Constitutional: She is oriented to person, place, and time. She appears well-developed and well-nourished. No distress. HEENT:   Head: Normocephalic and atraumatic.    Right Ear: Tympanic membrane, external ear and ear canal normal.   Left Ear: Tympanic membrane, external ear and ear canal normal.   Nose: Nose normal.   Mouth/Throat: Oropharynx is clear and moist, and mucous membranes are normal.  There is no cervical adenopathy. Eyes: Conjunctivae  normal. Pupils are equal, round, and reactive to light. Neck: Neck supple. No JVD present. Carotid bruit is not present. No mass and no thyromegaly present. Cardiovascular: Normal rate, regular rhythm, normal heart sounds and intact distal pulses. Exam reveals no gallop and no friction rub. No murmur heard. Pulmonary/Chest: Effort normal and breath sounds normal. No respiratory distress. She has no wheezes, rhonchi or rales. Abdominal: Soft, non-tender. Bowel sounds and aorta are normal. She exhibits no organomegaly, mass or bruit. Genitourinary:  Pap was not done, speculum exam not done. Bimanual exam was performed no adnexal masses or fullness. Breast exam:  breasts appear normal, no suspicious masses, no skin or nipple changes or axillary nodes. Musculoskeletal:  She exhibits no edema. Neurological: She is alert and oriented to person, place, and time. Canary Malady No cranial nerve deficit. Coordination normal.   Skin: Skin is warm and dry. There is no rash or erythema. No suspicious lesions noted. Psychiatric: She has a normal mood and affect.  Her speech is normal and behavior is normal. Judgment, cognition and memory are normal.           Lab Review:   Lab Results   Component Value Date     12/05/2016    K 4.5 12/05/2016     12/05/2016    CO2 27 12/05/2016    BUN 12 12/05/2016    CREATININE 0.5 12/05/2016    GLUCOSE 87 12/05/2016    CALCIUM 9.5 12/05/2016     Lab Results   Component Value Date    WBC 3.5 12/05/2016    HGB 12.6 12/05/2016    HCT 38.3 12/05/2016    MCV 79.3 12/05/2016     12/05/2016     Lab Results   Component Value Date    CHOL 180 05/23/2017    TRIG 83 05/23/2017    HDL 60 05/23/2017 Assessment/Plan: Kacy Benitez was seen today for annual exam.    Diagnoses and all orders for this visit:    Routine general medical examination at a health care facility  -     Lipid Panel  -     Comprehensive Metabolic Panel  -     CBC Auto Differential    Pain, abdominal, RLQ  -     US Pelvis Complete; Future    Hyperlipidemia, unspecified hyperlipidemia type, Continue Lipitor    Anxiety, continue Zoloft.     For her constipation, we talked about increasing fiber, even if it means taking Metamucil on a regular basis

## 2018-03-10 ENCOUNTER — HOSPITAL ENCOUNTER (OUTPATIENT)
Dept: CT IMAGING | Age: 58
Discharge: HOME | End: 2018-03-10
Attending: FAMILY MEDICINE | Admitting: FAMILY MEDICINE

## 2018-03-10 DIAGNOSIS — R10.31 RIGHT LOWER QUADRANT ABDOMINAL PAIN: ICD-10-CM

## 2018-03-12 ENCOUNTER — TELEPHONE (OUTPATIENT)
Dept: FAMILY MEDICINE CLINIC | Age: 58
End: 2018-03-12

## 2018-03-12 NOTE — TELEPHONE ENCOUNTER
isaeli- The patient had a CT scan on 3/10/18. I read over those results with her, she understood and had no questions. She will work harder at a high fiber diet as she can tolerate.

## 2018-07-11 ENCOUNTER — OFFICE VISIT (OUTPATIENT)
Dept: FAMILY MEDICINE CLINIC | Age: 58
End: 2018-07-11

## 2018-07-11 VITALS
HEIGHT: 62 IN | DIASTOLIC BLOOD PRESSURE: 62 MMHG | SYSTOLIC BLOOD PRESSURE: 94 MMHG | BODY MASS INDEX: 20.43 KG/M2 | HEART RATE: 80 BPM | WEIGHT: 111 LBS | OXYGEN SATURATION: 97 %

## 2018-07-11 DIAGNOSIS — F41.9 ANXIETY: ICD-10-CM

## 2018-07-11 DIAGNOSIS — K59.09 CHRONIC CONSTIPATION: ICD-10-CM

## 2018-07-11 DIAGNOSIS — E78.5 HYPERLIPIDEMIA, UNSPECIFIED HYPERLIPIDEMIA TYPE: Primary | ICD-10-CM

## 2018-07-11 LAB
A/G RATIO: 2.1 (ref 1.1–2.2)
ALBUMIN SERPL-MCNC: 4.8 G/DL (ref 3.4–5)
ALP BLD-CCNC: 58 U/L (ref 40–129)
ALT SERPL-CCNC: 36 U/L (ref 10–40)
ANION GAP SERPL CALCULATED.3IONS-SCNC: 15 MMOL/L (ref 3–16)
AST SERPL-CCNC: 33 U/L (ref 15–37)
BILIRUB SERPL-MCNC: 2 MG/DL (ref 0–1)
BUN BLDV-MCNC: 12 MG/DL (ref 7–20)
CALCIUM SERPL-MCNC: 9.5 MG/DL (ref 8.3–10.6)
CHLORIDE BLD-SCNC: 103 MMOL/L (ref 99–110)
CHOLESTEROL, TOTAL: 191 MG/DL (ref 0–199)
CO2: 25 MMOL/L (ref 21–32)
CREAT SERPL-MCNC: 0.5 MG/DL (ref 0.6–1.1)
GFR AFRICAN AMERICAN: >60
GFR NON-AFRICAN AMERICAN: >60
GLOBULIN: 2.3 G/DL
GLUCOSE BLD-MCNC: 86 MG/DL (ref 70–99)
HDLC SERPL-MCNC: 72 MG/DL (ref 40–60)
LDL CHOLESTEROL CALCULATED: 106 MG/DL
POTASSIUM SERPL-SCNC: 4.4 MMOL/L (ref 3.5–5.1)
SODIUM BLD-SCNC: 143 MMOL/L (ref 136–145)
TOTAL PROTEIN: 7.1 G/DL (ref 6.4–8.2)
TRIGL SERPL-MCNC: 64 MG/DL (ref 0–150)
VLDLC SERPL CALC-MCNC: 13 MG/DL

## 2018-07-11 PROCEDURE — 99214 OFFICE O/P EST MOD 30 MIN: CPT | Performed by: FAMILY MEDICINE

## 2018-07-11 PROCEDURE — 36415 COLL VENOUS BLD VENIPUNCTURE: CPT | Performed by: FAMILY MEDICINE

## 2018-07-11 RX ORDER — SERTRALINE HYDROCHLORIDE 25 MG/1
25 TABLET, FILM COATED ORAL DAILY
Qty: 30 TABLET | Refills: 3 | Status: SHIPPED | OUTPATIENT
Start: 2018-07-11 | End: 2019-03-27 | Stop reason: SDUPTHER

## 2018-07-11 RX ORDER — ATORVASTATIN CALCIUM 20 MG/1
TABLET, FILM COATED ORAL
Qty: 30 TABLET | Refills: 5 | Status: SHIPPED | OUTPATIENT
Start: 2018-07-11 | End: 2019-03-18 | Stop reason: SDUPTHER

## 2018-07-11 NOTE — PROGRESS NOTES
SUBJECTIVE:  Amara Chase is a 62 y.o. female who presents for evaluation of anxiety and hyperlipidemia. She indicates that she is feeling well and denies any symptoms referable to her elevated lipids. Specifically denies chest pain, palpitations, dyspnea, orthopnea, PND or peripheral edema or neuro sx. No anorexia, arthralgia, or leg cramps noted. Current medication regimen is as listed below. She denies any side effects of medication, and has been taking it regularly. Lab Results   Component Value Date    LDLCALC 105 (H) 12/20/2017       Patient was last seen in December. She stopped taking her Zoloft because she was in the grocery store one time and felt dizzy after she had had coffee and did not eat. But now she started feeling anxious again and wants to go back on her medication. Her cholesterol is well controlled on the Lipitor. She is a history of chronic constipation. She seeing GI and has had an evaluation. She takes over-the-counter Metamucil enemas if she has not had a bowel movement in 3 days or so. She has hemorrhoids as a result of straining for which she needs the hydrocortisone cream. We discussed that she does not need to have a bowel movement every day however I'm hopefully we can get her feeling better with Linzess    Current Outpatient Prescriptions   Medication Sig Dispense Refill    atorvastatin (LIPITOR) 20 MG tablet TAKE 1 TABLET BY MOUTH ONE TIME A DAY 30 tablet 5    hydrocortisone-pramoxine (ANALPRAM-HC) 2.5-1 % rectal cream PLACE RECTALLY TWO TIMES A DAY 60 g 0    sertraline (ZOLOFT) 25 MG tablet Take 1 tablet by mouth daily 30 tablet 3    Linaclotide (LINZESS) 72 MCG CAPS Take 72 mcg by mouth daily 30 capsule 5    pantoprazole sodium (PROTONIX) 40 MG PACK packet Take 40 mg by mouth every morning (before breakfast)      desoximetasone (TOPICORT) 0.25 % cream Apply topically 2 times daily, use sparingly 30 g 1     No current facility-administered medications for this visit.

## 2018-08-30 ENCOUNTER — TELEPHONE (OUTPATIENT)
Dept: FAMILY MEDICINE CLINIC | Age: 58
End: 2018-08-30

## 2019-03-18 DIAGNOSIS — E78.5 HYPERLIPIDEMIA, UNSPECIFIED HYPERLIPIDEMIA TYPE: ICD-10-CM

## 2019-03-18 RX ORDER — ATORVASTATIN CALCIUM 20 MG/1
TABLET, FILM COATED ORAL
Qty: 30 TABLET | Refills: 0 | Status: SHIPPED | OUTPATIENT
Start: 2019-03-18 | End: 2019-03-27 | Stop reason: SDUPTHER

## 2019-03-27 ENCOUNTER — OFFICE VISIT (OUTPATIENT)
Dept: FAMILY MEDICINE CLINIC | Age: 59
End: 2019-03-27
Payer: COMMERCIAL

## 2019-03-27 ENCOUNTER — TELEPHONE (OUTPATIENT)
Dept: FAMILY MEDICINE CLINIC | Age: 59
End: 2019-03-27

## 2019-03-27 VITALS
OXYGEN SATURATION: 98 % | HEIGHT: 62 IN | WEIGHT: 114.4 LBS | BODY MASS INDEX: 21.05 KG/M2 | SYSTOLIC BLOOD PRESSURE: 100 MMHG | DIASTOLIC BLOOD PRESSURE: 58 MMHG | HEART RATE: 73 BPM

## 2019-03-27 DIAGNOSIS — E78.5 HYPERLIPIDEMIA, UNSPECIFIED HYPERLIPIDEMIA TYPE: ICD-10-CM

## 2019-03-27 DIAGNOSIS — F41.9 ANXIETY: ICD-10-CM

## 2019-03-27 LAB
A/G RATIO: 2 (ref 1.1–2.2)
ALBUMIN SERPL-MCNC: 4.6 G/DL (ref 3.4–5)
ALP BLD-CCNC: 50 U/L (ref 40–129)
ALT SERPL-CCNC: 20 U/L (ref 10–40)
ANION GAP SERPL CALCULATED.3IONS-SCNC: 8 MMOL/L (ref 3–16)
AST SERPL-CCNC: 22 U/L (ref 15–37)
BILIRUB SERPL-MCNC: 1.5 MG/DL (ref 0–1)
BUN BLDV-MCNC: 11 MG/DL (ref 7–20)
CALCIUM SERPL-MCNC: 9.3 MG/DL (ref 8.3–10.6)
CHLORIDE BLD-SCNC: 107 MMOL/L (ref 99–110)
CHOLESTEROL, TOTAL: 179 MG/DL (ref 0–199)
CO2: 27 MMOL/L (ref 21–32)
CREAT SERPL-MCNC: 0.5 MG/DL (ref 0.6–1.1)
GFR AFRICAN AMERICAN: >60
GFR NON-AFRICAN AMERICAN: >60
GLOBULIN: 2.3 G/DL
GLUCOSE BLD-MCNC: 86 MG/DL (ref 70–99)
HDLC SERPL-MCNC: 63 MG/DL (ref 40–60)
LDL CHOLESTEROL CALCULATED: 101 MG/DL
POTASSIUM SERPL-SCNC: 4.4 MMOL/L (ref 3.5–5.1)
SODIUM BLD-SCNC: 142 MMOL/L (ref 136–145)
TOTAL PROTEIN: 6.9 G/DL (ref 6.4–8.2)
TRIGL SERPL-MCNC: 75 MG/DL (ref 0–150)
VLDLC SERPL CALC-MCNC: 15 MG/DL

## 2019-03-27 PROCEDURE — 99213 OFFICE O/P EST LOW 20 MIN: CPT | Performed by: FAMILY MEDICINE

## 2019-03-27 RX ORDER — ATORVASTATIN CALCIUM 20 MG/1
20 TABLET, FILM COATED ORAL DAILY
Qty: 90 TABLET | Refills: 1 | Status: SHIPPED | OUTPATIENT
Start: 2019-03-27 | End: 2019-09-23 | Stop reason: SDUPTHER

## 2019-03-27 RX ORDER — DESOXIMETASONE 2.5 MG/G
CREAM TOPICAL
Qty: 30 G | Refills: 1 | Status: SHIPPED | OUTPATIENT
Start: 2019-03-27 | End: 2020-04-29 | Stop reason: SDUPTHER

## 2019-03-27 RX ORDER — SERTRALINE HYDROCHLORIDE 25 MG/1
25 TABLET, FILM COATED ORAL DAILY
Qty: 90 TABLET | Refills: 1 | Status: SHIPPED | OUTPATIENT
Start: 2019-03-27 | End: 2019-09-23 | Stop reason: SDUPTHER

## 2019-03-27 ASSESSMENT — PATIENT HEALTH QUESTIONNAIRE - PHQ9
SUM OF ALL RESPONSES TO PHQ9 QUESTIONS 1 & 2: 0
2. FEELING DOWN, DEPRESSED OR HOPELESS: 0
SUM OF ALL RESPONSES TO PHQ QUESTIONS 1-9: 0
1. LITTLE INTEREST OR PLEASURE IN DOING THINGS: 0
SUM OF ALL RESPONSES TO PHQ QUESTIONS 1-9: 0

## 2019-04-09 ENCOUNTER — TELEPHONE (OUTPATIENT)
Dept: FAMILY MEDICINE CLINIC | Age: 59
End: 2019-04-09

## 2019-09-23 ENCOUNTER — OFFICE VISIT (OUTPATIENT)
Dept: FAMILY MEDICINE CLINIC | Age: 59
End: 2019-09-23
Payer: COMMERCIAL

## 2019-09-23 VITALS
WEIGHT: 108 LBS | SYSTOLIC BLOOD PRESSURE: 92 MMHG | BODY MASS INDEX: 19.88 KG/M2 | OXYGEN SATURATION: 98 % | DIASTOLIC BLOOD PRESSURE: 62 MMHG | HEIGHT: 62 IN | HEART RATE: 78 BPM

## 2019-09-23 DIAGNOSIS — E78.5 HYPERLIPIDEMIA, UNSPECIFIED HYPERLIPIDEMIA TYPE: ICD-10-CM

## 2019-09-23 DIAGNOSIS — K59.09 CHRONIC CONSTIPATION: ICD-10-CM

## 2019-09-23 DIAGNOSIS — F41.9 ANXIETY: ICD-10-CM

## 2019-09-23 LAB
A/G RATIO: 2.2 (ref 1.1–2.2)
ALBUMIN SERPL-MCNC: 4.7 G/DL (ref 3.4–5)
ALP BLD-CCNC: 50 U/L (ref 40–129)
ALT SERPL-CCNC: 16 U/L (ref 10–40)
ANION GAP SERPL CALCULATED.3IONS-SCNC: 13 MMOL/L (ref 3–16)
AST SERPL-CCNC: 21 U/L (ref 15–37)
BILIRUB SERPL-MCNC: 1.1 MG/DL (ref 0–1)
BUN BLDV-MCNC: 13 MG/DL (ref 7–20)
CALCIUM SERPL-MCNC: 9.4 MG/DL (ref 8.3–10.6)
CHLORIDE BLD-SCNC: 104 MMOL/L (ref 99–110)
CHOLESTEROL, TOTAL: 199 MG/DL (ref 0–199)
CO2: 26 MMOL/L (ref 21–32)
CREAT SERPL-MCNC: 0.5 MG/DL (ref 0.6–1.1)
GFR AFRICAN AMERICAN: >60
GFR NON-AFRICAN AMERICAN: >60
GLOBULIN: 2.1 G/DL
GLUCOSE BLD-MCNC: 91 MG/DL (ref 70–99)
HDLC SERPL-MCNC: 78 MG/DL (ref 40–60)
LDL CHOLESTEROL CALCULATED: 103 MG/DL
POTASSIUM SERPL-SCNC: 4.2 MMOL/L (ref 3.5–5.1)
SODIUM BLD-SCNC: 143 MMOL/L (ref 136–145)
TOTAL PROTEIN: 6.8 G/DL (ref 6.4–8.2)
TRIGL SERPL-MCNC: 91 MG/DL (ref 0–150)
VLDLC SERPL CALC-MCNC: 18 MG/DL

## 2019-09-23 PROCEDURE — 99214 OFFICE O/P EST MOD 30 MIN: CPT | Performed by: FAMILY MEDICINE

## 2019-09-23 RX ORDER — ATORVASTATIN CALCIUM 20 MG/1
20 TABLET, FILM COATED ORAL DAILY
Qty: 90 TABLET | Refills: 1 | Status: SHIPPED | OUTPATIENT
Start: 2019-09-23 | End: 2020-04-27

## 2019-09-23 RX ORDER — SERTRALINE HYDROCHLORIDE 25 MG/1
25 TABLET, FILM COATED ORAL DAILY
Qty: 90 TABLET | Refills: 1 | Status: SHIPPED | OUTPATIENT
Start: 2019-09-23 | End: 2020-04-27

## 2019-09-23 NOTE — PROGRESS NOTES
rubs. Regular rate and rhythm. Chest is clear; no wheezes or rales. No edema or JVD. Soft nontender  Neuro alert, no CN or motor deficits  Psych nl mood, thought and judgement    ASSESSMENT:   Diagnosis Orders   1. Hyperlipidemia, unspecified hyperlipidemia type  atorvastatin (LIPITOR) 20 MG tablet, labs pending    Lipid Panel    Comprehensive Metabolic Panel   2. Anxiety  sertraline (ZOLOFT) 25 MG tablet, stable   3. Chronic constipation  linaCLOtide (LINZESS) 67 MCG CAPS capsule, patient will work with the medicine to find out the balance of how frequently she needs to take this        Plan:  1)  Medication: continue current medication regimen unchanged  2)  Recheck in 6 months, sooner should new symptoms or problems arise. 3) LLR     Carmencita received counseling on the following healthy behaviors: exercise and medication adherence        Discussed use, benefit, and side effects of prescribed medications. Barriers to medication compliance addressed. All patient questions answered. Pt voiced understanding.

## 2020-04-27 RX ORDER — SERTRALINE HYDROCHLORIDE 25 MG/1
TABLET, FILM COATED ORAL
Qty: 90 TABLET | Refills: 0 | Status: SHIPPED | OUTPATIENT
Start: 2020-04-27 | End: 2020-09-14

## 2020-04-27 RX ORDER — ATORVASTATIN CALCIUM 20 MG/1
TABLET, FILM COATED ORAL
Qty: 90 TABLET | Refills: 0 | Status: SHIPPED | OUTPATIENT
Start: 2020-04-27 | End: 2020-10-05 | Stop reason: SDUPTHER

## 2020-04-29 ENCOUNTER — TELEPHONE (OUTPATIENT)
Dept: ADMINISTRATIVE | Age: 60
End: 2020-04-29

## 2020-04-29 ENCOUNTER — VIRTUAL VISIT (OUTPATIENT)
Dept: FAMILY MEDICINE CLINIC | Age: 60
End: 2020-04-29
Payer: COMMERCIAL

## 2020-04-29 VITALS — WEIGHT: 118 LBS | BODY MASS INDEX: 21.71 KG/M2 | HEIGHT: 62 IN

## 2020-04-29 PROBLEM — L30.9 ECZEMA: Status: ACTIVE | Noted: 2020-04-29

## 2020-04-29 PROBLEM — K59.09 CHRONIC CONSTIPATION: Status: ACTIVE | Noted: 2020-04-29

## 2020-04-29 PROCEDURE — 99214 OFFICE O/P EST MOD 30 MIN: CPT | Performed by: FAMILY MEDICINE

## 2020-04-29 RX ORDER — HYDROCORTISONE ACETATE PRAMOXINE HCL 2.5; 1 G/100G; G/100G
CREAM TOPICAL 3 TIMES DAILY
Qty: 1 TUBE | Refills: 1 | Status: SHIPPED | OUTPATIENT
Start: 2020-04-29 | End: 2022-04-11 | Stop reason: ALTCHOICE

## 2020-04-29 RX ORDER — DESOXIMETASONE 2.5 MG/G
CREAM TOPICAL
Qty: 30 G | Refills: 1 | Status: SHIPPED | OUTPATIENT
Start: 2020-04-29 | End: 2022-10-12 | Stop reason: SDUPTHER

## 2020-04-29 ASSESSMENT — ENCOUNTER SYMPTOMS
ABDOMINAL PAIN: 0
SHORTNESS OF BREATH: 0
WHEEZING: 0
CHEST TIGHTNESS: 0
CONSTIPATION: 0

## 2020-04-29 ASSESSMENT — PATIENT HEALTH QUESTIONNAIRE - PHQ9
1. LITTLE INTEREST OR PLEASURE IN DOING THINGS: 0
SUM OF ALL RESPONSES TO PHQ9 QUESTIONS 1 & 2: 0
2. FEELING DOWN, DEPRESSED OR HOPELESS: 0
SUM OF ALL RESPONSES TO PHQ QUESTIONS 1-9: 0
SUM OF ALL RESPONSES TO PHQ QUESTIONS 1-9: 0

## 2020-04-29 NOTE — TELEPHONE ENCOUNTER
Submitted PA for Desoximetasone 0.25% cream, Key: [id number]. Medication has been APPROVED. Will scan approval letter once received. Please notify patient. Thank you.

## 2020-04-29 NOTE — PROGRESS NOTES
TWO TIMES A DAY Yes Chris Fields MD       Social History     Tobacco Use    Smoking status: Never Smoker    Smokeless tobacco: Never Used   Substance Use Topics    Alcohol use: Yes     Comment: Occasionally    Drug use: Not on file        Past Medical History:   Diagnosis Date    Allergic rhinitis     Eczema     GERD (gastroesophageal reflux disease)     Migraine        PHYSICAL EXAMINATION:  [ INSTRUCTIONS:  \"[x]\" Indicates a positive item  \"[]\" Indicates a negative item  -- DELETE ALL ITEMS NOT EXAMINED]  Vital Signs: (As obtained by patient/caregiver or practitioner observation)    Ht 5' 2\" (1.575 m) Comment: patient reported  Wt 118 lb (53.5 kg) Comment: patient reported  LMP 09/16/2011   BMI 21.58 kg/m²       Constitutional: [x] Appears well-developed and well-nourished [x] No apparent distress      [] Abnormal-   Mental status  [x] Alert and awake  [x] Oriented to person/place/time [x]Able to follow commands      Eyes:  EOM    [x]  Normal  [] Abnormal-  Sclera  [x]  Normal  [] Abnormal -         Discharge []  None visible  [] Abnormal -    HENT:   [x] Normocephalic, atraumatic.   [] Abnormal   [] Mouth/Throat: Mucous membranes are moist.     External Ears [] Normal  [] Abnormal-     Neck: [x] No visualized mass     Pulmonary/Chest: [x] Respiratory effort normal.  [x] No visualized signs of difficulty breathing or respiratory distress        [] Abnormal-      Musculoskeletal:   [] Normal gait with no signs of ataxia         [] Normal range of motion of neck        [] Abnormal-       Neurological:        [x] No Facial Asymmetry (Cranial nerve 7 motor function) (limited exam to video visit)          [x] No gaze palsy        [] Abnormal-         Skin:        [x] No significant exanthematous lesions or discoloration noted on facial skin         [] Abnormal-            Psychiatric:       [x] Normal Affect [x] No Hallucinations normal thought, normal judgment       [] Abnormal-     Other pertinent

## 2020-05-11 ENCOUNTER — TELEPHONE (OUTPATIENT)
Dept: ADMINISTRATIVE | Age: 60
End: 2020-05-11

## 2020-06-15 ENCOUNTER — TELEPHONE (OUTPATIENT)
Dept: FAMILY MEDICINE CLINIC | Age: 60
End: 2020-06-15

## 2020-06-16 NOTE — TELEPHONE ENCOUNTER
Patient was confused. She thought she needed to get the bloodwork from the April visit. She said she had Wellness check at work and her bp is low and she is tired all the time and someone at the wellness clinic told her to have her thyroid check. No diziness, some lightheadedness when she gets up. Feels like she could sleep 10-11 hours at a time. Always tired. 90/60 average bp for her. Please advise.

## 2020-09-14 RX ORDER — SERTRALINE HYDROCHLORIDE 25 MG/1
TABLET, FILM COATED ORAL
Qty: 135 TABLET | Refills: 1 | Status: SHIPPED | OUTPATIENT
Start: 2020-09-14 | End: 2021-04-05

## 2020-10-05 ENCOUNTER — OFFICE VISIT (OUTPATIENT)
Dept: FAMILY MEDICINE CLINIC | Age: 60
End: 2020-10-05
Payer: COMMERCIAL

## 2020-10-05 VITALS
BODY MASS INDEX: 20.16 KG/M2 | HEIGHT: 63 IN | TEMPERATURE: 97.6 F | OXYGEN SATURATION: 98 % | WEIGHT: 113.8 LBS | DIASTOLIC BLOOD PRESSURE: 60 MMHG | HEART RATE: 68 BPM | SYSTOLIC BLOOD PRESSURE: 90 MMHG

## 2020-10-05 LAB
A/G RATIO: 2.2 (ref 1.1–2.2)
ALBUMIN SERPL-MCNC: 4.7 G/DL (ref 3.4–5)
ALP BLD-CCNC: 61 U/L (ref 40–129)
ALT SERPL-CCNC: 19 U/L (ref 10–40)
ANION GAP SERPL CALCULATED.3IONS-SCNC: 11 MMOL/L (ref 3–16)
AST SERPL-CCNC: 24 U/L (ref 15–37)
BILIRUB SERPL-MCNC: 1.2 MG/DL (ref 0–1)
BUN BLDV-MCNC: 12 MG/DL (ref 7–20)
CALCIUM SERPL-MCNC: 9.6 MG/DL (ref 8.3–10.6)
CHLORIDE BLD-SCNC: 104 MMOL/L (ref 99–110)
CHOLESTEROL, TOTAL: 177 MG/DL (ref 0–199)
CO2: 26 MMOL/L (ref 21–32)
CREAT SERPL-MCNC: 0.6 MG/DL (ref 0.6–1.1)
GFR AFRICAN AMERICAN: >60
GFR NON-AFRICAN AMERICAN: >60
GLOBULIN: 2.1 G/DL
GLUCOSE BLD-MCNC: 78 MG/DL (ref 70–99)
HDLC SERPL-MCNC: 62 MG/DL (ref 40–60)
LDL CHOLESTEROL CALCULATED: 102 MG/DL
POTASSIUM SERPL-SCNC: 4.8 MMOL/L (ref 3.5–5.1)
SODIUM BLD-SCNC: 141 MMOL/L (ref 136–145)
TOTAL PROTEIN: 6.8 G/DL (ref 6.4–8.2)
TRIGL SERPL-MCNC: 67 MG/DL (ref 0–150)
VLDLC SERPL CALC-MCNC: 13 MG/DL

## 2020-10-05 PROCEDURE — 90686 IIV4 VACC NO PRSV 0.5 ML IM: CPT | Performed by: FAMILY MEDICINE

## 2020-10-05 PROCEDURE — 99214 OFFICE O/P EST MOD 30 MIN: CPT | Performed by: FAMILY MEDICINE

## 2020-10-05 PROCEDURE — 90471 IMMUNIZATION ADMIN: CPT | Performed by: FAMILY MEDICINE

## 2020-10-05 RX ORDER — ATORVASTATIN CALCIUM 20 MG/1
TABLET, FILM COATED ORAL
Qty: 90 TABLET | Refills: 1 | Status: SHIPPED | OUTPATIENT
Start: 2020-10-05 | End: 2021-04-05 | Stop reason: SDUPTHER

## 2020-10-05 NOTE — PROGRESS NOTES
Vaccine Information Sheet, \"Influenza - Inactivated\"  given to Ean Osuna, or parent/legal guardian of  Ean Osuna and verbalized understanding. Patient responses:    Have you ever had a reaction to a flu vaccine? No  Do you have any current illness? No  Have you ever had Guillian Pierson Syndrome? No  Do you have a serious allergy to any of the follow: Neomycin, Polymyxin, Thimerosal, eggs or egg products? No    Flu vaccine given per order. Please see immunization tab. Risks and benefits explained. Current VIS given.
09/16/2011   SpO2 98%   BMI 20.48 kg/m²   NAD  Neck no bruit or JVD  S1 and S2 normal, no murmurs, clicks, gallops or rubs. Regular rate and rhythm. Chest is clear; no wheezes or rales. No edema or JVD. Neuro alert, no CN or motor deficits  Psych nl mood, thought and judgement    ASSESSMENT:   Diagnosis Orders   1. Hyperlipidemia, unspecified hyperlipidemia type, labs pending atorvastatin (LIPITOR) 20 MG tablet    Lipid Panel    Comprehensive Metabolic Panel   2. Needs flu shot  INFLUENZA, QUADV, 3 YRS AND OLDER, IM PF, PREFILL SYR OR SDV, 0.5ML (AFLURIA QUADV, PF)   3. Anxiety   continue Zoloft   4. Chronic constipation   continue Metamucil   We discussed the shingles vaccine. We will provide this to the patient at her next appointment in April    Plan:  1)  Medication: continue current medication regimen unchanged  2)  Recheck in 6 months, sooner should new symptoms or problems arise. 3) LLR     Carmencita received counseling on the following healthy behaviors: nutrition, exercise and medication adherence      Discussed use, benefit, and side effects of prescribed medications. Barriers to medication compliance addressed. All patient questions answered. Pt voiced understanding.

## 2020-11-11 ENCOUNTER — TELEPHONE (OUTPATIENT)
Dept: FAMILY MEDICINE CLINIC | Age: 60
End: 2020-11-11

## 2020-11-11 NOTE — TELEPHONE ENCOUNTER
VALDEZ  --  Patient was directly exposed to someone Monday at work that tested positive for COVID yesterday. I explained the 48 hours waiting period, gave her the COVID line number.

## 2021-04-05 ENCOUNTER — OFFICE VISIT (OUTPATIENT)
Dept: FAMILY MEDICINE CLINIC | Age: 61
End: 2021-04-05
Payer: COMMERCIAL

## 2021-04-05 VITALS
HEART RATE: 77 BPM | WEIGHT: 118.8 LBS | OXYGEN SATURATION: 98 % | HEIGHT: 63 IN | DIASTOLIC BLOOD PRESSURE: 60 MMHG | SYSTOLIC BLOOD PRESSURE: 100 MMHG | BODY MASS INDEX: 21.05 KG/M2 | TEMPERATURE: 97.8 F

## 2021-04-05 DIAGNOSIS — F41.9 ANXIETY: ICD-10-CM

## 2021-04-05 DIAGNOSIS — E78.5 HYPERLIPIDEMIA, UNSPECIFIED HYPERLIPIDEMIA TYPE: Primary | ICD-10-CM

## 2021-04-05 DIAGNOSIS — K59.09 CHRONIC CONSTIPATION: ICD-10-CM

## 2021-04-05 DIAGNOSIS — Z23 NEED FOR SHINGLES VACCINE: ICD-10-CM

## 2021-04-05 LAB
A/G RATIO: 1.8 (ref 1.1–2.2)
ALBUMIN SERPL-MCNC: 4.5 G/DL (ref 3.4–5)
ALP BLD-CCNC: 61 U/L (ref 40–129)
ALT SERPL-CCNC: 17 U/L (ref 10–40)
ANION GAP SERPL CALCULATED.3IONS-SCNC: 11 MMOL/L (ref 3–16)
AST SERPL-CCNC: 22 U/L (ref 15–37)
BILIRUB SERPL-MCNC: 0.7 MG/DL (ref 0–1)
BUN BLDV-MCNC: 12 MG/DL (ref 7–20)
CALCIUM SERPL-MCNC: 9.2 MG/DL (ref 8.3–10.6)
CHLORIDE BLD-SCNC: 103 MMOL/L (ref 99–110)
CHOLESTEROL, TOTAL: 221 MG/DL (ref 0–199)
CO2: 27 MMOL/L (ref 21–32)
CREAT SERPL-MCNC: 0.6 MG/DL (ref 0.6–1.2)
GFR AFRICAN AMERICAN: >60
GFR NON-AFRICAN AMERICAN: >60
GLOBULIN: 2.5 G/DL
GLUCOSE BLD-MCNC: 82 MG/DL (ref 70–99)
HDLC SERPL-MCNC: 65 MG/DL (ref 40–60)
LDL CHOLESTEROL CALCULATED: 143 MG/DL
POTASSIUM SERPL-SCNC: 4.3 MMOL/L (ref 3.5–5.1)
SODIUM BLD-SCNC: 141 MMOL/L (ref 136–145)
TOTAL PROTEIN: 7 G/DL (ref 6.4–8.2)
TRIGL SERPL-MCNC: 65 MG/DL (ref 0–150)
VLDLC SERPL CALC-MCNC: 13 MG/DL

## 2021-04-05 PROCEDURE — 99214 OFFICE O/P EST MOD 30 MIN: CPT | Performed by: FAMILY MEDICINE

## 2021-04-05 PROCEDURE — 90471 IMMUNIZATION ADMIN: CPT | Performed by: FAMILY MEDICINE

## 2021-04-05 PROCEDURE — 90750 HZV VACC RECOMBINANT IM: CPT | Performed by: FAMILY MEDICINE

## 2021-04-05 RX ORDER — SERTRALINE HYDROCHLORIDE 25 MG/1
25 TABLET, FILM COATED ORAL DAILY
COMMUNITY
End: 2021-07-19

## 2021-04-05 RX ORDER — SERTRALINE HYDROCHLORIDE 25 MG/1
25 TABLET, FILM COATED ORAL DAILY
Qty: 90 TABLET | Refills: 1 | Status: SHIPPED | OUTPATIENT
Start: 2021-04-05 | End: 2021-10-08 | Stop reason: SDUPTHER

## 2021-04-05 RX ORDER — ATORVASTATIN CALCIUM 20 MG/1
TABLET, FILM COATED ORAL
Qty: 90 TABLET | Refills: 1 | Status: SHIPPED | OUTPATIENT
Start: 2021-04-05 | End: 2021-10-08 | Stop reason: SDUPTHER

## 2021-04-05 ASSESSMENT — PATIENT HEALTH QUESTIONNAIRE - PHQ9
SUM OF ALL RESPONSES TO PHQ QUESTIONS 1-9: 0
2. FEELING DOWN, DEPRESSED OR HOPELESS: 0
SUM OF ALL RESPONSES TO PHQ QUESTIONS 1-9: 0
SUM OF ALL RESPONSES TO PHQ QUESTIONS 1-9: 0
SUM OF ALL RESPONSES TO PHQ9 QUESTIONS 1 & 2: 0
1. LITTLE INTEREST OR PLEASURE IN DOING THINGS: 0

## 2021-04-05 NOTE — PROGRESS NOTES
Jean Aiken presents for   Chief Complaint   Patient presents with    Hyperlipidemia     pt states that she is here for a 6 month f/u, is fasting for bw    Anxiety     pt would like for her Sertaline to be increased to 50 mg.        ASSESSMENT:   Diagnosis Orders   1. Hyperlipidemia, unspecified hyperlipidemia type, labs are pending continue Lipitor atorvastatin (LIPITOR) 20 MG tablet    Lipid Panel    Comprehensive Metabolic Panel   2. Anxiety, patient had been taking 37.5 mg of Zoloft which was helpful. Patient states it was difficult to break the pill in half to take 1-1/2 pills. He was asking to either go up to a full 50 mg or back down to 25 mg. Since she is doing well and not having any anxiety symptoms, I recommended that we go to 25 mg a day. Patient will let me know if this is not sufficient to control her symptoms. At that time we would increase to 50 mg.  I will wait to hear from patient sertraline (ZOLOFT) 25 MG tablet   3. Chronic constipation, symptoms are well controlled with Metamucil    4. Need for shingles vaccine, shot #1 today discussed potential side effects return in 2 to 6 months for shot #2. Patient plans to return for a Pap smear. We will take care of her Pap smear and give her her second shingles shot at the same visit [facility])        Plan:  1)  Medication: Medications are working and tolerated. Medications as listed above. We may need to make an adjustment in her Zoloft after couple weeks  2)  Recheck in 6 months, sooner should new symptoms or problems arise. 3) LLR          SUBJECTIVE:  Jean Aiken is a 61 y.o. female who presents for evaluation of anxiety and chronic constipation and hyperlipidemia. She indicates that she is feeling well and denies any symptoms referable to her elevated blood lipids   specifically denies chest pain, palpitations, dyspnea, orthopnea, PND or peripheral edema or neuro sx.   No anorexia, arthralgia, or leg cramps noted. Current medication regimen is as listed below. She denies any side effects of medication, and has been taking it regularly. Lab Results   Component Value Date    LDLCALC 102 (H) 10/05/2020       Patient says that her anxiety is well controlled. She finds it problematic to break the pill in half to take 1-1/2 pills of Zoloft a day. She was asking to either take 25 or 50. Since her anxiety is well controlled we opted for the 25 mg. Her constipation is managed with Metamucil. She does not need any further intervention at this time. Her cholesterol has been well controlled on Lipitor. Patient has recently started Pilates and very much enjoys it. She has had both of her Covid shots last one was end of February. We will give her for shingles shot today. Discussed potential side effects    Current Outpatient Medications   Medication Sig Dispense Refill    sertraline (ZOLOFT) 25 MG tablet Take 1 tablet by mouth daily 90 tablet 1    atorvastatin (LIPITOR) 20 MG tablet TAKE 1 TABLET BY MOUTH ONE TIME A DAY 90 tablet 1    sertraline (ZOLOFT) 25 MG tablet Take 25 mg by mouth daily      desoximetasone (TOPICORT) 0.25 % cream Apply topically 2 times daily, use sparingly 30 g 1    Hydrocort-Pramoxine, Perianal, (ANALPRAM HC) 2.5-1 % rectal cream Place rectally 3 times daily 1 Tube 1     No current facility-administered medications for this visit. No Known Allergies    Social History     Tobacco Use    Smoking status: Never Smoker    Smokeless tobacco: Never Used   Substance Use Topics    Alcohol use: Yes     Comment: Occasionally       OBJECTIVE:   /60   Pulse 77   Temp 97.8 °F (36.6 °C) (Temporal)   Ht 5' 2.5\" (1.588 m)   Wt 118 lb 12.8 oz (53.9 kg)   LMP 09/16/2011   SpO2 98%   BMI 21.38 kg/m²   NAD  Neck no bruit or JVD  S1 and S2 normal, no murmurs, clicks, gallops or rubs. Regular rate and rhythm. Chest is clear; no wheezes or rales. No edema or JVD.   Neuro alert, no CN or motor deficits  Psych nl mood, thought and judgement

## 2021-07-19 ENCOUNTER — OFFICE VISIT (OUTPATIENT)
Dept: FAMILY MEDICINE CLINIC | Age: 61
End: 2021-07-19
Payer: COMMERCIAL

## 2021-07-19 VITALS
WEIGHT: 118.8 LBS | HEART RATE: 75 BPM | OXYGEN SATURATION: 99 % | DIASTOLIC BLOOD PRESSURE: 60 MMHG | BODY MASS INDEX: 21.05 KG/M2 | HEIGHT: 63 IN | SYSTOLIC BLOOD PRESSURE: 104 MMHG

## 2021-07-19 DIAGNOSIS — Z23 NEED FOR SHINGLES VACCINE: ICD-10-CM

## 2021-07-19 DIAGNOSIS — Z01.419 ENCOUNTER FOR GYNECOLOGICAL EXAMINATION WITHOUT ABNORMAL FINDING: Primary | ICD-10-CM

## 2021-07-19 PROCEDURE — 90750 HZV VACC RECOMBINANT IM: CPT | Performed by: FAMILY MEDICINE

## 2021-07-19 PROCEDURE — 90471 IMMUNIZATION ADMIN: CPT | Performed by: FAMILY MEDICINE

## 2021-07-19 PROCEDURE — 99396 PREV VISIT EST AGE 40-64: CPT | Performed by: FAMILY MEDICINE

## 2021-07-19 RX ORDER — ESTRADIOL 0.1 MG/G
CREAM VAGINAL
Qty: 1 TUBE | Refills: 3 | Status: SHIPPED | OUTPATIENT
Start: 2021-07-19 | End: 2022-10-06

## 2021-07-19 SDOH — ECONOMIC STABILITY: FOOD INSECURITY: WITHIN THE PAST 12 MONTHS, YOU WORRIED THAT YOUR FOOD WOULD RUN OUT BEFORE YOU GOT MONEY TO BUY MORE.: NEVER TRUE

## 2021-07-19 SDOH — ECONOMIC STABILITY: FOOD INSECURITY: WITHIN THE PAST 12 MONTHS, THE FOOD YOU BOUGHT JUST DIDN'T LAST AND YOU DIDN'T HAVE MONEY TO GET MORE.: NEVER TRUE

## 2021-07-19 ASSESSMENT — SOCIAL DETERMINANTS OF HEALTH (SDOH): HOW HARD IS IT FOR YOU TO PAY FOR THE VERY BASICS LIKE FOOD, HOUSING, MEDICAL CARE, AND HEATING?: NOT HARD AT ALL

## 2021-07-19 NOTE — PROGRESS NOTES
Subjective: This56 y.o. woman comes in today for pap smear only. Her most recent Pap smear was on  and showed an atrophic pattern. Patient is . Menopausal since at least . She denies any spotting or postmenopausal bleeding. She is current on her mammogram.  She is asking for refill on her estrogen vaginal cream  Previous abnormal Pap smears: no Contraception: post menopausal status    Objective:     /60   Pulse 75   Ht 5' 2.5\" (1.588 m)   Wt 118 lb 12.8 oz (53.9 kg)   LMP 2011   SpO2 99%   BMI 21.38 kg/m²     General appearance: alert, well appearing, and in no distress. S1 and S2 normal, no murmurs, clicks, gallops or rubs. Regular rate and rhythm. Chest is clear; no wheezes or rales. No edema or JVD. Breast exam: breasts appear normal, no suspicious masses, no skin or nipple changes or axillary nodes. Pelvic Exam: cervix normal in appearance, external genitalia normal, vagina normal without discharge. Pap smear obtained. Assessment:     Screening pap smear. Encounter Diagnoses   Name Primary?  Encounter for gynecological examination without abnormal finding Yes    Need for shingles vaccine          Plan:     Follow up in 3-5 years, or as indicated by Pap results. Diagnosis Orders   1. Encounter for gynecological examination without abnormal finding  PAP SMEAR   2.  Need for shingles vaccine, shot #2 given discussed potential side effects [facility])

## 2021-07-20 ENCOUNTER — TELEPHONE (OUTPATIENT)
Dept: ADMINISTRATIVE | Age: 61
End: 2021-07-20

## 2021-07-20 NOTE — TELEPHONE ENCOUNTER
Submitted PA for Estradiol 0.1MG/GM cream Key: [id number] . [facility] Per plan this medication is no prior authorization needed. Please notify patient. Thank you.

## 2021-07-21 LAB
HPV COMMENT: NORMAL
HPV TYPE 16: NOT DETECTED
HPV TYPE 18: NOT DETECTED
HPVOH (OTHER TYPES): NOT DETECTED

## 2021-10-08 ENCOUNTER — OFFICE VISIT (OUTPATIENT)
Dept: FAMILY MEDICINE CLINIC | Age: 61
End: 2021-10-08
Payer: COMMERCIAL

## 2021-10-08 VITALS
HEART RATE: 66 BPM | DIASTOLIC BLOOD PRESSURE: 70 MMHG | WEIGHT: 119.4 LBS | OXYGEN SATURATION: 97 % | HEIGHT: 63 IN | SYSTOLIC BLOOD PRESSURE: 102 MMHG | BODY MASS INDEX: 21.16 KG/M2

## 2021-10-08 DIAGNOSIS — K59.09 CHRONIC CONSTIPATION: ICD-10-CM

## 2021-10-08 DIAGNOSIS — F41.9 ANXIETY: ICD-10-CM

## 2021-10-08 DIAGNOSIS — N95.1 MENOPAUSAL STATE: ICD-10-CM

## 2021-10-08 DIAGNOSIS — Z23 NEED FOR INFLUENZA VACCINATION: ICD-10-CM

## 2021-10-08 DIAGNOSIS — E78.5 HYPERLIPIDEMIA, UNSPECIFIED HYPERLIPIDEMIA TYPE: Primary | ICD-10-CM

## 2021-10-08 LAB
A/G RATIO: 1.8 (ref 1.1–2.2)
ALBUMIN SERPL-MCNC: 4.6 G/DL (ref 3.4–5)
ALP BLD-CCNC: 72 U/L (ref 40–129)
ALT SERPL-CCNC: 31 U/L (ref 10–40)
ANION GAP SERPL CALCULATED.3IONS-SCNC: 13 MMOL/L (ref 3–16)
AST SERPL-CCNC: 29 U/L (ref 15–37)
BILIRUB SERPL-MCNC: 1.2 MG/DL (ref 0–1)
BUN BLDV-MCNC: 13 MG/DL (ref 7–20)
CALCIUM SERPL-MCNC: 9.6 MG/DL (ref 8.3–10.6)
CHLORIDE BLD-SCNC: 102 MMOL/L (ref 99–110)
CHOLESTEROL, TOTAL: 177 MG/DL (ref 0–199)
CO2: 25 MMOL/L (ref 21–32)
CREAT SERPL-MCNC: 0.6 MG/DL (ref 0.6–1.2)
GFR AFRICAN AMERICAN: >60
GFR NON-AFRICAN AMERICAN: >60
GLOBULIN: 2.6 G/DL
GLUCOSE BLD-MCNC: 86 MG/DL (ref 70–99)
HDLC SERPL-MCNC: 56 MG/DL (ref 40–60)
LDL CHOLESTEROL CALCULATED: 105 MG/DL
POTASSIUM SERPL-SCNC: 3.9 MMOL/L (ref 3.5–5.1)
SODIUM BLD-SCNC: 140 MMOL/L (ref 136–145)
TOTAL PROTEIN: 7.2 G/DL (ref 6.4–8.2)
TRIGL SERPL-MCNC: 78 MG/DL (ref 0–150)
VLDLC SERPL CALC-MCNC: 16 MG/DL

## 2021-10-08 PROCEDURE — 90471 IMMUNIZATION ADMIN: CPT | Performed by: FAMILY MEDICINE

## 2021-10-08 PROCEDURE — 99214 OFFICE O/P EST MOD 30 MIN: CPT | Performed by: FAMILY MEDICINE

## 2021-10-08 PROCEDURE — 90686 IIV4 VACC NO PRSV 0.5 ML IM: CPT | Performed by: FAMILY MEDICINE

## 2021-10-08 RX ORDER — ATORVASTATIN CALCIUM 20 MG/1
TABLET, FILM COATED ORAL
Qty: 90 TABLET | Refills: 1 | Status: SHIPPED | OUTPATIENT
Start: 2021-10-08 | End: 2021-12-22

## 2021-10-08 RX ORDER — SERTRALINE HYDROCHLORIDE 25 MG/1
25 TABLET, FILM COATED ORAL DAILY
Qty: 90 TABLET | Refills: 1 | Status: SHIPPED | OUTPATIENT
Start: 2021-10-08 | End: 2021-12-21

## 2021-10-08 NOTE — PROGRESS NOTES
Anju Castro presents for   Chief Complaint   Patient presents with    Hyperlipidemia     Pt is here for 6 month follow up and FBW    Anxiety        ASSESSMENT:   Diagnosis Orders   1. Hyperlipidemia, unspecified hyperlipidemia type, labs are pending continue statin Lipid Panel    Comprehensive Metabolic Panel    atorvastatin (LIPITOR) 20 MG tablet   2. Need for influenza vaccination  INFLUENZA, QUADV, 3 YRS AND OLDER, IM PF, PREFILL SYR OR SDV, 0.5ML (AFLURIA QUADV, PF)   3. Anxiety, stable well-controlled on current regimen sertraline (ZOLOFT) 25 MG tablet   4. Chronic constipation, continue with fiber and water    5. Menopausal state patient has a stress fracture treated by [facility]. We will assess her bone density DEXA BONE DENSITY AXIAL SKELETON        Plan:  1)  Medication: continue current medication regimen unchanged, medications are working and tolerated, continue as listed  2)  Recheck in 6 months, sooner should new symptoms or problems arise. 3) LLR          SUBJECTIVE:  Anju Castro is a 61 y.o. female who presents for evaluation of anxiety, chronic constipation and hyperlipidemia. She indicates that she is feeling well and denies any symptoms referable to her elevated blood lipids. Specifically denies chest pain, palpitations, dyspnea, orthopnea, PND or peripheral edema or neuro sx. No anorexia, arthralgia, or leg cramps noted. Current medication regimen is as listed below. She denies any side effects of medication, and has been taking it regularly. Lab Results   Component Value Date    LDLCALC 143 (H) 04/05/2021       Overall patient is doing well. She is here for fasting blood work. She just received her flu shot. Her anxiety is well controlled on Zoloft. She continues to have issues with chronic constipation. . We discussed the benefits of taking fiber on a more consistent basis instead of as needed.  Since patient's last appointment, she was treated for stress fracture in her left foot. Orthopedics was asking for a bone density test. She has tried to increase her calcium intake    Current Outpatient Medications   Medication Sig Dispense Refill    sertraline (ZOLOFT) 25 MG tablet Take 1 tablet by mouth daily 90 tablet 1    atorvastatin (LIPITOR) 20 MG tablet TAKE 1 TABLET BY MOUTH ONE TIME A DAY 90 tablet 1    estradiol (ESTRACE VAGINAL) 0.1 MG/GM vaginal cream Use 1 g daily for 2 weeks, then 1 g twice a week 1 Tube 3    desoximetasone (TOPICORT) 0.25 % cream Apply topically 2 times daily, use sparingly 30 g 1    Hydrocort-Pramoxine, Perianal, (ANALPRAM HC) 2.5-1 % rectal cream Place rectally 3 times daily 1 Tube 1     No current facility-administered medications for this visit. No Known Allergies    Social History     Tobacco Use    Smoking status: Never Smoker    Smokeless tobacco: Never Used   Substance Use Topics    Alcohol use: Yes     Comment: Occasionally       OBJECTIVE:   /70 (Site: Left Upper Arm, Position: Sitting)   Pulse 66   Ht 5' 2.5\" (1.588 m)   Wt 119 lb 6.4 oz (54.2 kg)   LMP 09/16/2011   SpO2 97%   BMI 21.49 kg/m²   NAD  Neck no bruit or JVD  S1 and S2 normal, no murmurs, clicks, gallops or rubs. Regular rate and rhythm. Chest is clear; no wheezes or rales. No edema or JVD. Neuro alert, no CN or motor deficits  Psych nl mood, thought and judgement                EMR Dragon/transcription disclaimer:  Much of this encounter note is electronic transcription/translation of spoken language to printed texts. The electronic translation of spoken language may be erroneous, or at times, nonsensical words or phrases may be inadvertently transcribed.   Although I have reviewed the note for such errors, some may still exist.

## 2021-11-19 ENCOUNTER — HOSPITAL ENCOUNTER (OUTPATIENT)
Dept: WOMENS IMAGING | Age: 61
Discharge: HOME | End: 2021-11-19
Payer: COMMERCIAL

## 2021-11-19 DIAGNOSIS — N95.1 MENOPAUSAL STATE: ICD-10-CM

## 2021-11-19 PROCEDURE — 77080 DXA BONE DENSITY AXIAL: CPT

## 2021-12-22 DIAGNOSIS — E78.5 HYPERLIPIDEMIA, UNSPECIFIED HYPERLIPIDEMIA TYPE: ICD-10-CM

## 2021-12-22 RX ORDER — ATORVASTATIN CALCIUM 20 MG/1
TABLET, FILM COATED ORAL
Qty: 90 TABLET | Refills: 1 | Status: SHIPPED | OUTPATIENT
Start: 2021-12-22 | End: 2022-04-06

## 2021-12-22 NOTE — TELEPHONE ENCOUNTER
Anel Alves is requesting refill(s) lipitor  Last OV 10/8/21 (pertaining to medication)  LR 10/8/21 (per medication requested)  Next office visit scheduled or attempted Yes   If no, reason:  4/11/22

## 2022-01-25 ENCOUNTER — TELEMEDICINE (OUTPATIENT)
Dept: FAMILY MEDICINE CLINIC | Age: 62
End: 2022-01-25
Payer: COMMERCIAL

## 2022-01-25 DIAGNOSIS — J06.9 VIRAL URI: Primary | ICD-10-CM

## 2022-01-25 PROCEDURE — 99213 OFFICE O/P EST LOW 20 MIN: CPT | Performed by: FAMILY MEDICINE

## 2022-01-25 ASSESSMENT — ENCOUNTER SYMPTOMS
SHORTNESS OF BREATH: 0
COUGH: 0
NAUSEA: 1
SINUS PRESSURE: 1
SINUS PAIN: 1

## 2022-01-25 NOTE — PROGRESS NOTES
Noemí Weber (:  1960) is a Established patient, here for evaluation of the following:    Assessment & Plan   Below is the assessment and plan developed based on review of pertinent history, physical exam, labs, studies, and medications. 1. Viral URI, even with a negative PCR test, I think she has Covid. She was instructed to take Advil 600 800 mg with some food 3 times a day instead of her Tylenol to see if that gives her better relief of her headache and sinus pain. She will also take Mucinex and drink plenty of fluids and rest.  If her symptoms are not resolving by the end of this week she will let me know    Return if symptoms worsen or fail to improve. Subjective   Patient started 5 days ago with sudden onset of headache, sinus pain, nausea, body aches, fever off and on and just feeling very tired. She did have a negative PCR Covid test.  She has been taking Tylenol for her symptoms and not improving. She has a dry cough. Her  is not ill. She is fully vaccinated and been wearing a mask    Review of Systems   Constitutional: Positive for fatigue and fever. HENT: Positive for sinus pressure and sinus pain. Respiratory: Negative for cough and shortness of breath. Gastrointestinal: Positive for nausea.           Objective   Patient-Reported Vitals  Patient-Reported Temperature: 100  Patient-Reported Weight: 120lb  Patient-Reported Height: 5'2       Physical Exam  [INSTRUCTIONS:  \"[x]\" Indicates a positive item  \"[]\" Indicates a negative item  -- DELETE ALL ITEMS NOT EXAMINED]    Constitutional: [x] Appears well-developed and well-nourished [x] No apparent distress      [] Abnormal -     Mental status: [x] Alert and awake  [x] Oriented to person/place/time [x] Able to follow commands    [] Abnormal -     Eyes:   EOM    [x]  Normal    [] Abnormal -   Sclera  [x]  Normal    [] Abnormal -          Discharge [x]  None visible   [] Abnormal -     HENT: [x] Normocephalic, atraumatic [] Abnormal -   [] Mouth/Throat: Mucous membranes are moist    External Ears [x] Normal  [] Abnormal -    Neck: [x] No visualized mass [] Abnormal -     Pulmonary/Chest: [x] Respiratory effort normal   [x] No visualized signs of difficulty breathing or respiratory distress        [] Abnormal -      Musculoskeletal:   [x] Normal gait with no signs of ataxia         [x] Normal range of motion of neck        [] Abnormal -     Neurological:        [x] No Facial Asymmetry (Cranial nerve 7 motor function) (limited exam due to video visit)          [x] No gaze palsy        [] Abnormal -          Skin:        [x] No significant exanthematous lesions or discoloration noted on facial skin         [] Abnormal -            Psychiatric:       [x] Normal Affect [] Abnormal -        [x] No Hallucinations    Other pertinent observable physical exam findings:-                 Noemífelicity Weber, was evaluated through a synchronous (real-time) audio-video encounter. The patient (or guardian if applicable) is aware that this is a billable service, which includes applicable co-pays. This Virtual Visit was conducted with patient's (and/or legal guardian's) consent. The visit was conducted pursuant to the emergency declaration under the [address] [place] [address] authority and the [employer] Act. Patient identification was verified, and a caregiver was present when appropriate. The patient was located at home in a state where the provider was licensed to provide care.        --Christine Dunn MD

## 2022-04-06 DIAGNOSIS — E78.5 HYPERLIPIDEMIA, UNSPECIFIED HYPERLIPIDEMIA TYPE: ICD-10-CM

## 2022-04-06 DIAGNOSIS — F41.9 ANXIETY: ICD-10-CM

## 2022-04-06 RX ORDER — ATORVASTATIN CALCIUM 20 MG/1
TABLET, FILM COATED ORAL
Qty: 90 TABLET | Refills: 1 | Status: SHIPPED | OUTPATIENT
Start: 2022-04-06 | End: 2022-10-12 | Stop reason: SDUPTHER

## 2022-04-06 RX ORDER — SERTRALINE HYDROCHLORIDE 25 MG/1
TABLET, FILM COATED ORAL
Qty: 90 TABLET | Refills: 1 | Status: SHIPPED | OUTPATIENT
Start: 2022-04-06 | End: 2022-04-27 | Stop reason: DRUGHIGH

## 2022-04-06 NOTE — TELEPHONE ENCOUNTER
Evaristo Hathaway is requesting refill(s) sertraline  Last OV 10/8/21 (pertaining to medication)  LR 12/21/21 (per medication requested)  Next office visit scheduled or attempted Yes   If no, reason:  4/11/22    Evaristo Hathaway is requesting refill(s) lipitor  Last OV 10/8/21 (pertaining to medication)  LR 12/22/21 (per medication requested)  Next office visit scheduled or attempted Yes   If no, reason:  4/11/22

## 2022-04-11 ENCOUNTER — OFFICE VISIT (OUTPATIENT)
Dept: FAMILY MEDICINE CLINIC | Age: 62
End: 2022-04-11
Payer: COMMERCIAL

## 2022-04-11 VITALS
BODY MASS INDEX: 21.93 KG/M2 | DIASTOLIC BLOOD PRESSURE: 64 MMHG | HEART RATE: 76 BPM | OXYGEN SATURATION: 99 % | SYSTOLIC BLOOD PRESSURE: 110 MMHG | HEIGHT: 63 IN | WEIGHT: 123.8 LBS

## 2022-04-11 DIAGNOSIS — Z00.00 ROUTINE GENERAL MEDICAL EXAMINATION AT A HEALTH CARE FACILITY: Primary | ICD-10-CM

## 2022-04-11 DIAGNOSIS — E78.5 HYPERLIPIDEMIA, UNSPECIFIED HYPERLIPIDEMIA TYPE: ICD-10-CM

## 2022-04-11 DIAGNOSIS — F41.9 ANXIETY: ICD-10-CM

## 2022-04-11 LAB
A/G RATIO: 2 (ref 1.1–2.2)
ALBUMIN SERPL-MCNC: 4.7 G/DL (ref 3.4–5)
ALP BLD-CCNC: 69 U/L (ref 40–129)
ALT SERPL-CCNC: 32 U/L (ref 10–40)
ANION GAP SERPL CALCULATED.3IONS-SCNC: 14 MMOL/L (ref 3–16)
AST SERPL-CCNC: 28 U/L (ref 15–37)
BASOPHILS ABSOLUTE: 0 K/UL (ref 0–0.2)
BASOPHILS RELATIVE PERCENT: 0.7 %
BILIRUB SERPL-MCNC: 0.9 MG/DL (ref 0–1)
BUN BLDV-MCNC: 9 MG/DL (ref 7–20)
CALCIUM SERPL-MCNC: 9.2 MG/DL (ref 8.3–10.6)
CHLORIDE BLD-SCNC: 105 MMOL/L (ref 99–110)
CHOLESTEROL, TOTAL: 174 MG/DL (ref 0–199)
CO2: 24 MMOL/L (ref 21–32)
CREAT SERPL-MCNC: 0.6 MG/DL (ref 0.6–1.2)
EOSINOPHILS ABSOLUTE: 0.2 K/UL (ref 0–0.6)
EOSINOPHILS RELATIVE PERCENT: 5.4 %
GFR AFRICAN AMERICAN: >60
GFR NON-AFRICAN AMERICAN: >60
GLUCOSE BLD-MCNC: 91 MG/DL (ref 70–99)
HCT VFR BLD CALC: 39.3 % (ref 36–48)
HDLC SERPL-MCNC: 56 MG/DL (ref 40–60)
HEMOGLOBIN: 13.3 G/DL (ref 12–16)
LDL CHOLESTEROL CALCULATED: 98 MG/DL
LYMPHOCYTES ABSOLUTE: 1.3 K/UL (ref 1–5.1)
LYMPHOCYTES RELATIVE PERCENT: 33.1 %
MCH RBC QN AUTO: 30.4 PG (ref 26–34)
MCHC RBC AUTO-ENTMCNC: 33.9 G/DL (ref 31–36)
MCV RBC AUTO: 89.8 FL (ref 80–100)
MONOCYTES ABSOLUTE: 0.4 K/UL (ref 0–1.3)
MONOCYTES RELATIVE PERCENT: 9.1 %
NEUTROPHILS ABSOLUTE: 2.1 K/UL (ref 1.7–7.7)
NEUTROPHILS RELATIVE PERCENT: 51.7 %
PDW BLD-RTO: 13.8 % (ref 12.4–15.4)
PLATELET # BLD: 236 K/UL (ref 135–450)
PMV BLD AUTO: 8.3 FL (ref 5–10.5)
POTASSIUM SERPL-SCNC: 4.5 MMOL/L (ref 3.5–5.1)
RBC # BLD: 4.37 M/UL (ref 4–5.2)
SODIUM BLD-SCNC: 143 MMOL/L (ref 136–145)
TOTAL PROTEIN: 7 G/DL (ref 6.4–8.2)
TRIGL SERPL-MCNC: 99 MG/DL (ref 0–150)
VLDLC SERPL CALC-MCNC: 20 MG/DL
WBC # BLD: 4 K/UL (ref 4–11)

## 2022-04-11 PROCEDURE — 99396 PREV VISIT EST AGE 40-64: CPT | Performed by: FAMILY MEDICINE

## 2022-04-11 PROCEDURE — 36415 COLL VENOUS BLD VENIPUNCTURE: CPT | Performed by: FAMILY MEDICINE

## 2022-04-11 ASSESSMENT — PATIENT HEALTH QUESTIONNAIRE - PHQ9
9. THOUGHTS THAT YOU WOULD BE BETTER OFF DEAD, OR OF HURTING YOURSELF: 0
1. LITTLE INTEREST OR PLEASURE IN DOING THINGS: 2
5. POOR APPETITE OR OVEREATING: 0
7. TROUBLE CONCENTRATING ON THINGS, SUCH AS READING THE NEWSPAPER OR WATCHING TELEVISION: 0
3. TROUBLE FALLING OR STAYING ASLEEP: 0
SUM OF ALL RESPONSES TO PHQ9 QUESTIONS 1 & 2: 3
2. FEELING DOWN, DEPRESSED OR HOPELESS: 1
SUM OF ALL RESPONSES TO PHQ QUESTIONS 1-9: 3
8. MOVING OR SPEAKING SO SLOWLY THAT OTHER PEOPLE COULD HAVE NOTICED. OR THE OPPOSITE, BEING SO FIGETY OR RESTLESS THAT YOU HAVE BEEN MOVING AROUND A LOT MORE THAN USUAL: 0
6. FEELING BAD ABOUT YOURSELF - OR THAT YOU ARE A FAILURE OR HAVE LET YOURSELF OR YOUR FAMILY DOWN: 0
SUM OF ALL RESPONSES TO PHQ QUESTIONS 1-9: 3
SUM OF ALL RESPONSES TO PHQ QUESTIONS 1-9: 3
10. IF YOU CHECKED OFF ANY PROBLEMS, HOW DIFFICULT HAVE THESE PROBLEMS MADE IT FOR YOU TO DO YOUR WORK, TAKE CARE OF THINGS AT HOME, OR GET ALONG WITH OTHER PEOPLE: 0
SUM OF ALL RESPONSES TO PHQ QUESTIONS 1-9: 3
4. FEELING TIRED OR HAVING LITTLE ENERGY: 0

## 2022-04-11 NOTE — PROGRESS NOTES
History and Physical      Holly Cotto  YOB: 1960    Date of Service:  4/11/2022    Chief Complaint:   Ean Osuna is a 64 y.o. female who presents for complete physical examination. HPI: Patient is here for annual wellness visit. We reviewed her preventative measures. She is due for colonoscopy next year. She is current on her Pap smear and mammogram.  She has a history of anxiety for which she has been taking 25 mg of Zoloft for a while now. She does not feel it is as effective as it was initially. She was asking to increase the dose. We will have her increase her dose to 50 mg. She will send me a [employer] message in a couple weeks with an update on how she is doing with the 50 mg. If she is doing better, we will issue a new prescription for 50 mg pill. She has hyperlipidemia and has been taking Lipitor 20 mg. She is over the last month and a half at nighttime when she takes her Zoloft and her Lipitor together she is feeling a pulling sensation in her chest.  Is no pressure no heaviness headaches not exertional.  She is a non-smoker nondiabetic. We talked about her taking Zoloft in the morning and taking Lipitor at nighttime and see what happens there. She is to let me know if symptoms worsen.   Patient is diagnosed now with osteoporosis she is taken Fosamax weekly    Wt Readings from Last 3 Encounters:   04/11/22 123 lb 12.8 oz (56.2 kg)   10/08/21 119 lb 6.4 oz (54.2 kg)   07/19/21 118 lb 12.8 oz (53.9 kg)     BP Readings from Last 3 Encounters:   04/11/22 110/64   10/08/21 102/70   07/19/21 104/60       Patient Active Problem List   Diagnosis    Migraine    Allergic rhinitis    Hyperlipidemia    Anxiety    Eczema    Chronic constipation       Preventive Care:  Health Maintenance   Topic Date Due    Lipid screen  10/08/2022    Breast cancer screen  11/08/2022    Depression Screen  04/11/2023    Colorectal Cancer Screen  05/31/2023    Cervical cancer screen 07/19/2026    DTaP/Tdap/Td vaccine (3 - Td or Tdap) 06/12/2029    Flu vaccine  Completed    Shingles Vaccine  Completed    COVID-19 Vaccine  Completed    Hepatitis C screen  Completed    HIV screen  Completed    Hepatitis A vaccine  Aged Out    Hepatitis B vaccine  Aged Out    Hib vaccine  Aged Out    Meningococcal (ACWY) vaccine  Aged Out    Pneumococcal 0-64 years Vaccine  Aged Out       Last DexA scan: 2021, osteoporosis patient now on Fosamax    Exercise: yes    Lipid panel:   Lab Results   Component Value Date    TRIG 78 10/08/2021    HDL 56 10/08/2021    LDLCALC 105 10/08/2021          Immunization History   Administered Date(s) Administered    COVID-19, Pfizer Purple top, DILUTE for use, 12+ yrs, 30mcg/0.3mL dose 02/04/2021, 02/27/2021, 12/05/2021    Influenza, Malika Angel, IM, PF (6 mo and older Fluzone, Flulaval, Fluarix, and 3 yrs and older Afluria) 10/05/2020, 10/08/2021    Tdap (Boostrix, Adacel) 12/05/2016, 06/12/2019    Tetanus 05/16/2006    Zoster Recombinant (Shingrix) 04/05/2021, 07/19/2021       No Known Allergies  Current Outpatient Medications   Medication Sig Dispense Refill    atorvastatin (LIPITOR) 20 MG tablet TAKE 1 TABLET BY MOUTH EVERY DAY 90 tablet 1    sertraline (ZOLOFT) 25 MG tablet TAKE 1 TABLET BY MOUTH EVERY DAY 90 tablet 1    alendronate (FOSAMAX) 70 MG tablet Take 1 tablet by mouth every 7 days 4 tablet 10    estradiol (ESTRACE VAGINAL) 0.1 MG/GM vaginal cream Use 1 g daily for 2 weeks, then 1 g twice a week 1 Tube 3    desoximetasone (TOPICORT) 0.25 % cream Apply topically 2 times daily, use sparingly 30 g 1     No current facility-administered medications for this visit.        Past Medical History:   Diagnosis Date    Allergic rhinitis     Eczema     GERD (gastroesophageal reflux disease)     Migraine      Past Surgical History:   Procedure Laterality Date    SINUS SURGERY  2003, 2010    x2     Family History   Problem Relation Age of Onset    Stroke Mother  Heart Disease Mother     Cancer Father         brain    Dementia Father     Diabetes Other     Other Sister         hypothyroid    Heart Disease Maternal Grandfather     Diabetes Paternal Grandmother     Heart Disease Paternal Grandmother      Social History     Socioeconomic History    Marital status:      Spouse name: Not on file    Number of children: 2    Years of education: Not on file    Highest education level: Not on file   Occupational History    Occupation: realtor   Tobacco Use    Smoking status: Never Smoker    Smokeless tobacco: Never Used   Substance and Sexual Activity    Alcohol use: Yes     Comment: Occasionally    Drug use: Never    Sexual activity: Not on file   Other Topics Concern    Not on file   Social History Narrative    Healthy diet, not exercising,     Social Determinants of Health     Financial Resource Strain: Low Risk     Difficulty of Paying Living Expenses: Not hard at all   Food Insecurity: No Food Insecurity    Worried About [address] in the Last Year: Never true    [address] [employer] in the Last Year: Never true   Transportation Needs:     Lack of Transportation (Medical): Not on file    Lack of Transportation (Non-Medical):  Not on file   Physical Activity:     Days of Exercise per Week: Not on file    Minutes of Exercise per Session: Not on file   Stress:     Feeling of Stress : Not on file   Social Connections:     Frequency of Communication with Friends and Family: Not on file    Frequency of Social Gatherings with Friends and Family: Not on file    Attends [employer] Services: Not on file    Active Member of Clubs or Organizations: Not on file    Attends Club or Organization Meetings: Not on file    Marital Status: Not on file   Intimate Partner Violence:     Fear of Current or Ex-Partner: Not on file    Emotionally Abused: Not on file    Physically Abused: Not on file    Sexually Abused: Not on file   Housing Stability:     Unable to Pay for Housing in the Last Year: Not on file    Number of Places Lived in the Last Year: Not on file    Unstable Housing in the Last Year: Not on file       Review of Systems:  A comprehensive review of systems was negative except for what was noted in the HPI. Physical Exam:   Vitals:    04/11/22 0808   BP: 110/64   Pulse: 76   SpO2: 99%   Weight: 123 lb 12.8 oz (56.2 kg)   Height: 5' 2.5\" (1.588 m)     Body mass index is 22.28 kg/m². Constitutional: She is oriented to person, place, and time. She appears well-developed and well-nourished. No distress. HEENT:   Head: Normocephalic and atraumatic. Right Ear: Tympanic membrane, external ear and ear canal normal.   Left Ear: Tympanic membrane, external ear and ear canal normal.      Mouth/Throat: . There is no cervical adenopathy. Eyes: Conjunctivae  normal.   Neck: Neck supple. No JVD present. Carotid bruit is not present. No mass and no thyromegaly present. Cardiovascular: Normal rate, regular rhythm, normal heart sounds . Exam reveals no gallop and no friction rub. No murmur heard. Pulmonary/Chest: Effort normal and breath sounds normal. No respiratory distress. She has no wheezes, rhonchi or rales. Abdominal: Soft, non-tender. Bowel sounds and aorta are normal. She exhibits no organomegaly, mass or bruit. Genitourinary:  examination not indicated. Rectal: rectal not indicated by age criteria and lack of symptoms  Breast exam:  performed by specialist.  Musculoskeletal:  She exhibits no edema. Neurological: She is alert and oriented to person, place, and time. [place] No cranial nerve deficit. Coordination normal.   Skin: Skin is warm and dry. There is no rash or erythema. No suspicious lesions noted. Psychiatric: She has a normal mood and affect.  Her speech is normal and behavior is normal. Judgment, cognition and memory are normal.           Lab Review:   Lab Results   Component Value Date     10/08/2021    K 3.9 10/08/2021  10/08/2021    CO2 25 10/08/2021    BUN 13 10/08/2021    CREATININE 0.6 10/08/2021    GLUCOSE 86 10/08/2021    CALCIUM 9.6 10/08/2021     Lab Results   Component Value Date    WBC 3.9 12/20/2017    HGB 13.9 12/20/2017    HCT 41.2 12/20/2017    MCV 91.2 12/20/2017     12/20/2017     Lab Results   Component Value Date    CHOL 177 10/08/2021    TRIG 78 10/08/2021    HDL 56 10/08/2021        Assessment/Plan: Anne Kauffman was seen today for annual exam, depression and hyperlipidemia. Diagnoses and all orders for this visit:    Routine general medical examination at a health care facility  -     Lipid Panel  -     Comprehensive Metabolic Panel  -     CBC with Auto Differential    Anxiety, will increase her Zoloft dose to 50 mg. She will take 2 of her 25 mg pills. She will send a [email or web address] message in 2 weeks with an update on how the 50 mg is working.   We will also have her take this in the morning separate from her Lipitor    Hyperlipidemia, unspecified hyperlipidemia type, stable, labs are pending continue Lipitor 20 mg

## 2022-04-27 ENCOUNTER — PATIENT MESSAGE (OUTPATIENT)
Dept: FAMILY MEDICINE CLINIC | Age: 62
End: 2022-04-27

## 2022-04-27 NOTE — TELEPHONE ENCOUNTER
From: Maryse Rivera  To: Dr. Tony Ordoñez: 4/27/2022 6:31 AM EDT  Subject: Zoloft refill    Good Morning, I need to refill my prescription at 50mg. I only have one day left. When I went to get the refill a couple of weeks ago the insurance would not pay for it because they said it was to soon, can your office reach out to the insurance company and explain? Also I went back to taking my meds at night.   Thank you,  Christal Landry

## 2022-04-29 NOTE — TELEPHONE ENCOUNTER
Please let patient know that her prescription was sent to the pharmacy.   I do not want her to be out

## 2022-10-04 ENCOUNTER — APPOINTMENT (OUTPATIENT)
Dept: CT IMAGING | Age: 62
End: 2022-10-04
Payer: COMMERCIAL

## 2022-10-04 ENCOUNTER — HOSPITAL ENCOUNTER (EMERGENCY)
Age: 62
Discharge: HOME | End: 2022-10-04
Payer: COMMERCIAL

## 2022-10-04 ENCOUNTER — NURSE TRIAGE (OUTPATIENT)
Dept: OTHER | Facility: CLINIC | Age: 62
End: 2022-10-04

## 2022-10-04 VITALS
OXYGEN SATURATION: 100 % | RESPIRATION RATE: 16 BRPM | SYSTOLIC BLOOD PRESSURE: 122 MMHG | HEIGHT: 62 IN | BODY MASS INDEX: 22.76 KG/M2 | TEMPERATURE: 97.3 F | WEIGHT: 123.68 LBS | DIASTOLIC BLOOD PRESSURE: 80 MMHG | HEART RATE: 70 BPM

## 2022-10-04 DIAGNOSIS — R10.13 ABDOMINAL PAIN, EPIGASTRIC: Primary | ICD-10-CM

## 2022-10-04 LAB
A/G RATIO: 1.5 (ref 1.1–2.2)
ALBUMIN SERPL-MCNC: 4.4 G/DL (ref 3.4–5)
ALP BLD-CCNC: 74 U/L (ref 40–129)
ALT SERPL-CCNC: 41 U/L (ref 10–40)
ANION GAP SERPL CALCULATED.3IONS-SCNC: 15 MMOL/L (ref 3–16)
AST SERPL-CCNC: 37 U/L (ref 15–37)
BASOPHILS ABSOLUTE: 0 K/UL (ref 0–0.2)
BASOPHILS RELATIVE PERCENT: 0.8 %
BILIRUB SERPL-MCNC: 0.6 MG/DL (ref 0–1)
BILIRUBIN URINE: NEGATIVE
BLOOD, URINE: NEGATIVE
BUN BLDV-MCNC: 7 MG/DL (ref 7–20)
CALCIUM SERPL-MCNC: 9.3 MG/DL (ref 8.3–10.6)
CHLORIDE BLD-SCNC: 103 MMOL/L (ref 99–110)
CLARITY: CLEAR
CO2: 22 MMOL/L (ref 21–32)
COLOR: YELLOW
CREAT SERPL-MCNC: 0.5 MG/DL (ref 0.6–1.2)
EOSINOPHILS ABSOLUTE: 0.1 K/UL (ref 0–0.6)
EOSINOPHILS RELATIVE PERCENT: 3.4 %
GFR AFRICAN AMERICAN: >60
GFR NON-AFRICAN AMERICAN: >60
GLUCOSE BLD-MCNC: 94 MG/DL (ref 70–99)
GLUCOSE URINE: NEGATIVE MG/DL
HCT VFR BLD CALC: 39.9 % (ref 36–48)
HEMOGLOBIN: 13.9 G/DL (ref 12–16)
KETONES, URINE: NEGATIVE MG/DL
LEUKOCYTE ESTERASE, URINE: NEGATIVE
LIPASE: 38 U/L (ref 13–60)
LYMPHOCYTES ABSOLUTE: 1 K/UL (ref 1–5.1)
LYMPHOCYTES RELATIVE PERCENT: 28.2 %
MCH RBC QN AUTO: 30.7 PG (ref 26–34)
MCHC RBC AUTO-ENTMCNC: 34.7 G/DL (ref 31–36)
MCV RBC AUTO: 88.3 FL (ref 80–100)
MICROSCOPIC EXAMINATION: NORMAL
MONOCYTES ABSOLUTE: 0.4 K/UL (ref 0–1.3)
MONOCYTES RELATIVE PERCENT: 12 %
NEUTROPHILS ABSOLUTE: 1.9 K/UL (ref 1.7–7.7)
NEUTROPHILS RELATIVE PERCENT: 55.6 %
NITRITE, URINE: NEGATIVE
PDW BLD-RTO: 12.9 % (ref 12.4–15.4)
PH UA: 7.5 (ref 5–8)
PLATELET # BLD: 264 K/UL (ref 135–450)
PMV BLD AUTO: 7.4 FL (ref 5–10.5)
POTASSIUM REFLEX MAGNESIUM: 3.9 MMOL/L (ref 3.5–5.1)
PROTEIN UA: NEGATIVE MG/DL
RBC # BLD: 4.52 M/UL (ref 4–5.2)
SODIUM BLD-SCNC: 140 MMOL/L (ref 136–145)
SPECIFIC GRAVITY UA: 1.01 (ref 1–1.03)
TOTAL PROTEIN: 7.3 G/DL (ref 6.4–8.2)
URINE REFLEX TO CULTURE: NORMAL
URINE TYPE: NORMAL
UROBILINOGEN, URINE: 0.2 E.U./DL
WBC # BLD: 3.5 K/UL (ref 4–11)

## 2022-10-04 PROCEDURE — 99285 EMERGENCY DEPT VISIT HI MDM: CPT

## 2022-10-04 PROCEDURE — 6360000004 HC RX CONTRAST MEDICATION: Performed by: PHYSICIAN ASSISTANT

## 2022-10-04 PROCEDURE — 80053 COMPREHEN METABOLIC PANEL: CPT

## 2022-10-04 PROCEDURE — 85025 COMPLETE CBC W/AUTO DIFF WBC: CPT

## 2022-10-04 PROCEDURE — 83690 ASSAY OF LIPASE: CPT

## 2022-10-04 PROCEDURE — 81003 URINALYSIS AUTO W/O SCOPE: CPT

## 2022-10-04 PROCEDURE — 74177 CT ABD & PELVIS W/CONTRAST: CPT

## 2022-10-04 RX ORDER — SUCRALFATE 1 G/1
1 TABLET ORAL 4 TIMES DAILY PRN
Qty: 30 TABLET | Refills: 0 | Status: SHIPPED | OUTPATIENT
Start: 2022-10-04 | End: 2022-10-12 | Stop reason: SDUPTHER

## 2022-10-04 RX ADMIN — IOPAMIDOL 75 ML: 755 INJECTION, SOLUTION INTRAVENOUS at 16:34

## 2022-10-04 ASSESSMENT — PAIN DESCRIPTION - PAIN TYPE: TYPE: CHRONIC PAIN

## 2022-10-04 ASSESSMENT — PAIN DESCRIPTION - LOCATION: LOCATION: ABDOMEN

## 2022-10-04 ASSESSMENT — PAIN - FUNCTIONAL ASSESSMENT: PAIN_FUNCTIONAL_ASSESSMENT: 0-10

## 2022-10-04 ASSESSMENT — PAIN SCALES - GENERAL
PAINLEVEL_OUTOF10: 6
PAINLEVEL_OUTOF10: 6

## 2022-10-04 NOTE — TELEPHONE ENCOUNTER
Received call from [facility] at [employer] with [email or web address]. Subjective: Caller states \"She reports constipation for 1 month, that caused rectal bleeding 2 weeks ago, that has cleared. She also reports upper abdominal pin that started during that time and has become constant. \"     Current Symptoms: constant upper abdominal pain, severe constipation    Onset: 1 month ago; gradual, worsening    Associated Symptoms: reduced activity, reduced appetite, reduced fluid intake, constipation    Pain Severity: 7/10; dull, aching, pressure; constant, moderate, severe    Temperature: no  n/a    What has been tried: metamucil, fiber, water, enemas x 3 to has BM. LMP: NA Pregnant: NA    Recommended disposition: Go to ED Now    Care advice provided, patient verbalizes understanding; denies any other questions or concerns; instructed to call back for any new or worsening symptoms. Patient/caller agrees to proceed to [id number] [facility] on [facility] Emergency Department    Attention Provider: Thank you for allowing me to participate in the care of your patient. The patient was connected to triage in response to information provided to the ECC/PSC. Please do not respond through this encounter as the response is not directed to a shared pool.         Reason for Disposition   SEVERE abdominal pain (e.g., excruciating)    Protocols used: Abdominal Pain - Upper-ADULT-[place]

## 2022-10-04 NOTE — ED PROVIDER NOTES
[address] [facility]        Pt Name: Kalie Knowles  MRN: [id number]  [place] 1960  Date of evaluation: 10/4/2022  Provider: Jorje Cushing, PA  PCP: Reza Lin MD  Note Started: 4:14 PM EDT     The ED Attending Physician was available for consultation but did not see or evaluate this patient. CHIEF COMPLAINT       Chief Complaint   Patient presents with    Constipation     Patient with constipation. Patient reports abdominal pain for a couple weeks. Told to come here from PCP. HISTORY OF PRESENT ILLNESS   (Location, Timing/Onset, Context/Setting, Quality, Duration, Modifying Factors, Severity, Associated Signs and Symptoms)  Note limiting factors. Kalie Knowles is a 64 y.o. female who presents with report of constipation and upper abdominal pain for a couple of weeks now. She says she is only able to pass stool and tiny radha, and she has been having aching pain mostly in the upper abdomen around the clock. Her appetite has been reduced but she is eating, no nausea or vomiting. Denies seeing any blood in the stool. You have hemorrhoids but denies any significant swelling or pain in the rectum or anal area. Denies any relevant trauma. Urinating normally. No history of abdominal surgery. Does not take narcotic pain medication. Nursing Notes were all reviewed and agreed with or any disagreements were addressed in the HPI. REVIEW OF SYSTEMS    (2-9 systems for level 4, 10 or more for level 5)     Positives and pertinent negatives as per HPI.      PAST MEDICAL HISTORY     Past Medical History:   Diagnosis Date    Allergic rhinitis     Eczema     GERD (gastroesophageal reflux disease)     Migraine        SURGICAL HISTORY     Past Surgical History:   Procedure Laterality Date    SINUS SURGERY  2003, 2010    x2       [address]       Discharge Medication List as of 10/4/2022  5:34 PM        CONTINUE these medications which have NOT CHANGED    Details   sertraline (ZOLOFT) 50 MG tablet TAKE 1 TABLET BY MOUTH DAILY, Disp-90 tablet, R-1ZERO refills remain on this prescription. Your patient is requesting advance approval of refills for this medication to [address] [facility] [address]      Hydrocort-Pramoxine, Perianal, (ANALPRAM HC) 2.5-1 % rectal cream Place rectally 3 times daily, Rectal, 3 TIMES DAILY Starting Fri 7/8/2022, Disp-60 g, R-0, Normal      atorvastatin (LIPITOR) 20 MG tablet TAKE 1 TABLET BY MOUTH EVERY DAY, Disp-90 tablet, R-1Normal      alendronate (FOSAMAX) 70 MG tablet Take 1 tablet by mouth every 7 days, Disp-4 tablet, R-10Normal      estradiol (ESTRACE VAGINAL) 0.1 MG/GM vaginal cream Use 1 g daily for 2 weeks, then 1 g twice a week, Disp-1 Tube, R-3Normal      desoximetasone (TOPICORT) 0.25 % cream Apply topically 2 times daily, use sparingly, Disp-30 g, R-1, Normal             ALLERGIES     Patient has no known allergies. FAMILYHISTORY       Family History   Problem Relation Age of Onset    Stroke Mother     Heart Disease Mother     Cancer Father         brain    Dementia Father     Diabetes Other     Other Sister         hypothyroid    Heart Disease Maternal Grandfather     Diabetes Paternal Grandmother     Heart Disease Paternal Grandmother         SOCIAL HISTORY       Social History     Tobacco Use    Smoking status: Never    Smokeless tobacco: Never   Substance Use Topics    Alcohol use: Yes     Comment: Occasionally    Drug use: Never       SCREENINGS    [place] Coma Scale  Eye Opening: Spontaneous  Best Verbal Response: Oriented  Best Motor Response: Obeys commands  Meg Coma Scale Score: 15      PHYSICAL EXAM    (up to 7 for level 4, 8 or more for level 5)     ED Triage Vitals [10/04/22 1307]   BP Temp Temp Source Heart Rate Resp SpO2 Height Weight   119/68 97.3 °F (36.3 °C) Oral 73 16 99 % 5' 2\" (1.575 m) 123 lb 10.9 oz (56.1 kg)       Physical Exam  Vitals and nursing note reviewed. Constitutional:       General: She is not in acute distress. Appearance: Normal appearance. She is not ill-appearing. HENT:      Head: Normocephalic and atraumatic. Nose: Nose normal.   Eyes:      General:         Right eye: No discharge. Left eye: No discharge. Cardiovascular:      Rate and Rhythm: Normal rate and regular rhythm. Heart sounds: Normal heart sounds. Pulmonary:      Effort: Pulmonary effort is normal. No respiratory distress. Breath sounds: Normal breath sounds. No stridor. No wheezing, rhonchi or rales. Abdominal:      General: Bowel sounds are normal. There is no distension. Palpations: Abdomen is soft. There is no mass. Tenderness: There is abdominal tenderness (mild, epigastric). There is no guarding or rebound. Musculoskeletal:         General: Normal range of motion. Cervical back: Normal range of motion. Skin:     General: Skin is warm and dry. Neurological:      General: No focal deficit present. Mental Status: She is alert and oriented to person, place, and time. Psychiatric:         Mood and Affect: Mood normal.         Behavior: Behavior normal.       DIAGNOSTIC RESULTS   LABS:    Labs Reviewed   CBC WITH AUTO DIFFERENTIAL - Abnormal; Notable for the following components:       Result Value    WBC 3.5 (*)     All other components within normal limits   COMPREHENSIVE METABOLIC PANEL W/ REFLEX TO MG FOR LOW K - Abnormal; Notable for the following components:    Creatinine 0.5 (*)     ALT 41 (*)     All other components within normal limits   LIPASE   URINALYSIS WITH REFLEX TO CULTURE       When ordered only abnormal lab results are displayed. All other labs were within normal range or not returned as of this dictation. EKG: When ordered, EKG's are interpreted by the Emergency Department Physician in the absence of a cardiologist.  Please see their note for interpretation of EKG.     RADIOLOGY:   All images such as plain radiographs, CT, Ultrasound and MRI are interpreted by a radiologist. Some images are visualized and preliminarily interpreted by me and/or the ED attending physician. Interpretation per the radiologist below, if available at the time of this note:    CT ABDOMEN PELVIS W IV CONTRAST Additional Contrast? None   Final Result   No acute finding in the abdomen or pelvis. Moderate sigmoid colon diverticulosis. Stool load is not increased. CONSULTS:  None    PROCEDURES   Unless otherwise noted below, none. Procedures    EMERGENCY DEPARTMENT COURSE and DIFFERENTIAL DIAGNOSIS/MDM:   Vitals:    Vitals:    10/04/22 1307 10/04/22 1730   BP: 119/68 122/80   Pulse: 73 70   Resp: 16 16   Temp: 97.3 °F (36.3 °C)    TempSrc: Oral    SpO2: 99% 100%   Weight: 123 lb 10.9 oz (56.1 kg)    Height: 5' 2\" (1.575 m)        Patient was given the following medications:  Medications   iopamidol (ISOVUE-370) 76 % injection 75 mL (75 mLs IntraVENous Given 10/4/22 1634)           Is this patient to be included in the SEP-1 Core Measure due to severe sepsis or septic shock? No   Exclusion criteria - the patient is NOT to be included for SEP-1 Core Measure due to: Infection is not suspected    Patient had normal physical exam, normal vital signs. There was mild epigastric tenderness but no significant right upper quadrant tenderness. Laboratory work-up was reassuring. CT scan the abdomen pelvis with IV contrast showed no acute findings. Patient reports the pain in her upper abdomen to be significant. Source is likely gastric or esophageal, but at this time there is no indication for hospitalization or further work-up. Suspicion for cholecystitis very low. Patient be discharged with prescription for sucralfate and given referral for gastroenterology follow-up. Says she has an appointment with family practice next week. The patient verbalized understanding and agreement with this plan of care.  The patient was advised to return to the emergency department if symptoms should significantly worsen or if new and concerning symptoms should appear. I estimate there is LOW risk for ACUTE APPENDICITIS, BOWEL OBSTRUCTION, CHOLECYSTITIS, DIVERTICULITIS, INCARCERATED HERNIA, PANCREATITIS, PERITONITIS, PELVIC INFLAMMATORY DISEASE, OVARIAN TORSION, PERFORATED BOWEL, BOWEL ISCHEMIA, CARDIAC ISCHEMIA, ECTOPIC PREGNANCY, TUBO-OVARIAN ABSCESS, or SEPSIS, thus I consider the discharge disposition reasonable. CRITICAL CARE TIME   None    FINAL IMPRESSION      1.  Abdominal pain, epigastric          DISPOSITION/PLAN   DISPOSITION Decision To Discharge 10/04/2022 05:23:51 PM      PATIENT REFERRED TO:  Betty Melendrez, [address] [place] [address] [address]    Schedule an appointment as soon as possible for a visit   For gastroenterology follow-up care    DISCHARGE MEDICATIONS:  Discharge Medication List as of 10/4/2022  5:34 PM        START taking these medications    Details   sucralfate (CARAFATE) 1 GM tablet Take 1 tablet by mouth 4 times daily as needed (for stomach/esophageal pain), Disp-30 tablet, R-0Normal             DISCONTINUED MEDICATIONS:  Discharge Medication List as of 10/4/2022  5:34 PM               (Please note that portions of this note were completed with a voice recognition program.  Efforts were made to edit the dictations but occasionally words are mis-transcribed.)    LEYLA Head (electronically signed)        Teetee Head  10/04/22 4761

## 2022-10-04 NOTE — TELEPHONE ENCOUNTER
George Hernandez [phone] (home)    is requesting refill(s) of medication Sertraline to preferred pharmacy [facility] 4/11/22 (pertaining to medication)   Last refill 4/29/22 (per medication requested)  Next office visit scheduled or attempted Yes  Date 10/12/22  If No, reason

## 2022-10-06 NOTE — PROGRESS NOTES
[facility] ENDOSCOPY EGD PRE-OPERATIVE INSTRUCTIONS    Procedure date___10/11/2022______Arrival time_1100___________        Surgery time_1200___________       Nothing by mouth after midnight the night before the procedure. This includes water chewing gum, mints and ice chips. You may brush your teeth and gargle the morning of your surgery, but do not swallow the water    You may be asked to stop blood thinners such as Coumadin, Plavix, Fragmin, Lovenox, etc., or any anti-inflammatories such as:  Aspirin, Ibuprofen, Advil, Naproxen prior to your procedure. We also ask that you stop any OTC medications such as fish oil, vitamin E, glucosamine, garlic, Multivitamins, COQ 10, etc.    You must make arrangements for a responsible adult to arrive with you and stay in our waiting area during your procedure. They will also need to take you home after your procedure. For your safety you will not be allowed to leave alone or drive yourself home. Also for your safety, it is strongly suggested that someone stay with you the first 24 hours after your procedure. For your comfort, please wear simple loose fitting clothing to the center. Please do not bring valuables. If you have a living will and a durable power of  for healthcare, please bring in a copy.     You will need to bring a photo ID and insurance card    Our goal is to provide you with excellent care so if you have any questions, please contact us at the [facility] at [phone]         Please note these are generalized instructions for all EGD cases, you may be provided with more specific instructions if necessary

## 2022-10-10 ENCOUNTER — ANESTHESIA EVENT (OUTPATIENT)
Dept: ENDOSCOPY | Age: 62
End: 2022-10-10
Payer: COMMERCIAL

## 2022-10-11 ENCOUNTER — ANESTHESIA (OUTPATIENT)
Dept: ENDOSCOPY | Age: 62
End: 2022-10-11
Payer: COMMERCIAL

## 2022-10-11 ENCOUNTER — HOSPITAL ENCOUNTER (OUTPATIENT)
Age: 62
Setting detail: OUTPATIENT SURGERY
Discharge: HOME | End: 2022-10-11
Attending: INTERNAL MEDICINE | Admitting: INTERNAL MEDICINE
Payer: COMMERCIAL

## 2022-10-11 VITALS
HEART RATE: 65 BPM | RESPIRATION RATE: 18 BRPM | OXYGEN SATURATION: 97 % | BODY MASS INDEX: 21.79 KG/M2 | SYSTOLIC BLOOD PRESSURE: 109 MMHG | TEMPERATURE: 96.8 F | WEIGHT: 118.38 LBS | HEIGHT: 62 IN | DIASTOLIC BLOOD PRESSURE: 73 MMHG

## 2022-10-11 DIAGNOSIS — R13.10 DYSPHAGIA, UNSPECIFIED TYPE: ICD-10-CM

## 2022-10-11 PROCEDURE — 7100000010 HC PHASE II RECOVERY - FIRST 15 MIN: Performed by: INTERNAL MEDICINE

## 2022-10-11 PROCEDURE — 2500000003 HC RX 250 WO HCPCS

## 2022-10-11 PROCEDURE — 3700000001 HC ADD 15 MINUTES (ANESTHESIA): Performed by: INTERNAL MEDICINE

## 2022-10-11 PROCEDURE — 3609012400 HC EGD TRANSORAL BIOPSY SINGLE/MULTIPLE: Performed by: INTERNAL MEDICINE

## 2022-10-11 PROCEDURE — 3700000000 HC ANESTHESIA ATTENDED CARE: Performed by: INTERNAL MEDICINE

## 2022-10-11 PROCEDURE — 88342 IMHCHEM/IMCYTCHM 1ST ANTB: CPT

## 2022-10-11 PROCEDURE — 2709999900 HC NON-CHARGEABLE SUPPLY: Performed by: INTERNAL MEDICINE

## 2022-10-11 PROCEDURE — 7100000011 HC PHASE II RECOVERY - ADDTL 15 MIN: Performed by: INTERNAL MEDICINE

## 2022-10-11 PROCEDURE — 88305 TISSUE EXAM BY PATHOLOGIST: CPT

## 2022-10-11 PROCEDURE — 6360000002 HC RX W HCPCS

## 2022-10-11 PROCEDURE — 2580000003 HC RX 258: Performed by: ANESTHESIOLOGY

## 2022-10-11 RX ORDER — LIDOCAINE HYDROCHLORIDE 20 MG/ML
INJECTION, SOLUTION EPIDURAL; INFILTRATION; INTRACAUDAL; PERINEURAL PRN
Status: DISCONTINUED | OUTPATIENT
Start: 2022-10-11 | End: 2022-10-11 | Stop reason: SDUPTHER

## 2022-10-11 RX ORDER — SODIUM CHLORIDE 0.9 % (FLUSH) 0.9 %
5-40 SYRINGE (ML) INJECTION PRN
Status: DISCONTINUED | OUTPATIENT
Start: 2022-10-11 | End: 2022-10-11 | Stop reason: HOSPADM

## 2022-10-11 RX ORDER — PROPOFOL 10 MG/ML
INJECTION, EMULSION INTRAVENOUS PRN
Status: DISCONTINUED | OUTPATIENT
Start: 2022-10-11 | End: 2022-10-11 | Stop reason: SDUPTHER

## 2022-10-11 RX ORDER — SODIUM CHLORIDE 0.9 % (FLUSH) 0.9 %
5-40 SYRINGE (ML) INJECTION EVERY 12 HOURS SCHEDULED
Status: DISCONTINUED | OUTPATIENT
Start: 2022-10-11 | End: 2022-10-11 | Stop reason: HOSPADM

## 2022-10-11 RX ORDER — SODIUM CHLORIDE 9 MG/ML
INJECTION, SOLUTION INTRAVENOUS PRN
Status: DISCONTINUED | OUTPATIENT
Start: 2022-10-11 | End: 2022-10-11 | Stop reason: HOSPADM

## 2022-10-11 RX ORDER — PROPOFOL 10 MG/ML
INJECTION, EMULSION INTRAVENOUS CONTINUOUS PRN
Status: DISCONTINUED | OUTPATIENT
Start: 2022-10-11 | End: 2022-10-11 | Stop reason: SDUPTHER

## 2022-10-11 RX ADMIN — PROPOFOL 100 MG: 10 INJECTION, EMULSION INTRAVENOUS at 12:14

## 2022-10-11 RX ADMIN — PROPOFOL 140 MCG/KG/MIN: 10 INJECTION, EMULSION INTRAVENOUS at 12:14

## 2022-10-11 RX ADMIN — LIDOCAINE HYDROCHLORIDE 60 MG: 20 INJECTION, SOLUTION EPIDURAL; INFILTRATION; INTRACAUDAL; PERINEURAL at 12:14

## 2022-10-11 RX ADMIN — SODIUM CHLORIDE: 9 INJECTION, SOLUTION INTRAVENOUS at 11:43

## 2022-10-11 ASSESSMENT — PAIN SCALES - WONG BAKER: WONGBAKER_NUMERICALRESPONSE: 0

## 2022-10-11 ASSESSMENT — LIFESTYLE VARIABLES: SMOKING_STATUS: 0

## 2022-10-11 ASSESSMENT — PAIN - FUNCTIONAL ASSESSMENT: PAIN_FUNCTIONAL_ASSESSMENT: 0-10

## 2022-10-11 NOTE — H&P
Gastroenterology Outpatient History and Physical     Patient: Noble Lindo MRN: [id number] Sex: female   YOB: 1960 Age: 64 y.o. Location: [address] [place] [address]    Date:10/11/2022  Primary Care Physician: Be Thomas MD         Patient: Noble Lindo    Physician: Sarah Chadwick MD    History of Present Illness: epigastric pain  Review of Systems:  Weight Loss: No  Dysphagia: No  Dyspepsia: No  History:  Past Medical History:   Diagnosis Date    Allergic rhinitis     Eczema     GERD (gastroesophageal reflux disease)     Migraine       Past Surgical History:   Procedure Laterality Date    COLONOSCOPY      SINUS SURGERY  2003, 2010    x2      Social History     Socioeconomic History    Marital status:      Spouse name: None    Number of children: 2    Years of education: None    Highest education level: None   Occupational History    Occupation: realtor   Tobacco Use    Smoking status: Never    Smokeless tobacco: Never   Substance and Sexual Activity    Alcohol use: Yes     Comment: Occasionally    Drug use: Never   Social History Narrative    Healthy diet, not exercising,      Family History   Problem Relation Age of Onset    Stroke Mother     Heart Disease Mother     Cancer Father         brain    Dementia Father     Diabetes Other     Other Sister         hypothyroid    Heart Disease Maternal Grandfather     Diabetes Paternal Grandmother     Heart Disease Paternal Grandmother      Allergies: No Known Allergies  Medications:   Prior to Admission medications    Medication Sig Start Date End Date Taking?  Authorizing Provider   sertraline (ZOLOFT) 50 MG tablet TAKE 1 TABLET BY MOUTH DAILY 10/4/22   Be Thomas MD   sucralfate (CARAFATE) 1 GM tablet Take 1 tablet by mouth 4 times daily as needed (for stomach/esophageal pain) 10/4/22 10/18/22  LEYLA Narayan   Hydrocort-Pramoxine, Perianal, (ANALPRAM HC) 2.5-1 % rectal cream Place rectally 3 times daily 7/8/22   Amanda Thomason Shonna Layne MD   atorvastatin (LIPITOR) 20 MG tablet TAKE 1 TABLET BY MOUTH EVERY DAY 4/6/22   Shaneka Taylor MD   alendronate (FOSAMAX) 70 MG tablet Take 1 tablet by mouth every 7 days 12/28/21   Shaneka Taylor MD   desoximetasone (TOPICORT) 0.25 % cream Apply topically 2 times daily, use sparingly 4/29/20   Shaneka Taylor MD       Vital Signs: /70   Pulse 68   Temp (!) 96.3 °F (35.7 °C) (Temporal)   Resp 16   Ht 5' 2\" (1.575 m)   Wt 118 lb 6 oz (53.7 kg)   LMP 09/16/2011   SpO2 98%   BMI 21.65 kg/m²   Physical Exam:   Heart: regular   Lungs: non-labored breathing  Mental status:  Alert and oriented    ASA score:  ASA 2 - Patient with mild systemic disease with no functional limitations{  Mallimpati score:  2     Planned Procedure: EGD    Planned Sedation: Monitored anesthesia.     Signed By: Eliseo Marques MD   October 11, 2022

## 2022-10-11 NOTE — DISCHARGE INSTRUCTIONS
Endoscopy Discharge Instructions    Call  [unfilled] with any questions or concerns. You may be drowsy or lightheaded after receiving sedation. DO NOT operate  a vehicle (automobile, bicycle, motorcycle, machinery, or power tools), no  alcoholic beverages, and do not make any important decisions today. Plan on bed rest or quiet relaxation today. Resume normal activities in the morning. Resume normal activity tomorrow unless otherwise advised by your physician. Eat a light first meal, avoiding spicy and fatty foods, then resume normal diet unless you are told otherwise by your physician. If the intravenous medication site is painful, apply warm compresses on the site until the soreness is relieved and elevate the arm above the heart. Call your physician if no improvement  in 2-3 days. POSSIBLE SYMPTOMS TO WATCH:     1. fever (greater than 100) 5. increased abdominal bloating   2. severe pain   6. excessive bleeding   3. nausea and vomiting  7. chest pain   4. chills    8. shortness of breath       Notify Dr Yaron English if these problems occur     Expected as normal and remedies:  Sore throat: use over the counter throat lozenges or gargle with warm salt water. Redness or soreness at the IV site: apply warm compress  Gaseous discomfort: belching or passing flatus (gas). -Await pathology. Jyoti Bangura MD    With questions or concerns, call  [unfilled] at [phone].

## 2022-10-11 NOTE — ANESTHESIA PRE PROCEDURE
Department of Anesthesiology  Preprocedure Note       Name:  Janell Ortega   Age:  64 y.o.  :  1960                                          MRN:  [id number]         Date:  10/11/2022      Surgeon: Migue Tilley):  Sabrina Rolon MD    Procedure: Procedure(s):  EGD ESOPHAGOGASTRODUODENOSCOPY    Medications prior to admission:   Prior to Admission medications    Medication Sig Start Date End Date Taking? Authorizing Provider   sertraline (ZOLOFT) 50 MG tablet TAKE 1 TABLET BY MOUTH DAILY 10/4/22   Jennifer Vazquez MD   sucralfate (CARAFATE) 1 GM tablet Take 1 tablet by mouth 4 times daily as needed (for stomach/esophageal pain) 10/4/22 10/18/22  LEYLA Hollis   Hydrocort-Pramoxine, Perianal, (ANALPRAM HC) 2.5-1 % rectal cream Place rectally 3 times daily 22   Jennifer Vazquez MD   atorvastatin (LIPITOR) 20 MG tablet TAKE 1 TABLET BY MOUTH EVERY DAY 22   Jennifer Vazquez MD   alendronate (FOSAMAX) 70 MG tablet Take 1 tablet by mouth every 7 days 21   Jennifer Vazquez MD   desoximetasone (TOPICORT) 0.25 % cream Apply topically 2 times daily, use sparingly 20   Jennifer Vazquez MD       Current medications:    No current facility-administered medications for this encounter.        Allergies:  No Known Allergies    Problem List:    Patient Active Problem List   Diagnosis Code    Migraine G43.909    Allergic rhinitis J30.9    Hyperlipidemia E78.5    Anxiety F41.9    Eczema L30.9    Chronic constipation K59.09       Past Medical History:        Diagnosis Date    Allergic rhinitis     Eczema     GERD (gastroesophageal reflux disease)     Migraine        Past Surgical History:        Procedure Laterality Date    COLONOSCOPY      SINUS SURGERY  , 2010    x2       Social History:    Social History     Tobacco Use    Smoking status: Never    Smokeless tobacco: Never   Substance Use Topics    Alcohol use: Yes     Comment: Occasionally                                Counseling given: Not Answered      Vital Signs (Current):   Vitals:    10/06/22 1248   Weight: 125 lb (56.7 kg)   Height: 5' 2\" (1.575 m)                                              BP Readings from Last 3 Encounters:   10/04/22 122/80   04/11/22 110/64   10/08/21 102/70       NPO Status:                                                                                 BMI:   Wt Readings from Last 3 Encounters:   10/06/22 125 lb (56.7 kg)   10/04/22 123 lb 10.9 oz (56.1 kg)   04/11/22 123 lb 12.8 oz (56.2 kg)     Body mass index is 22.86 kg/m². CBC:   Lab Results   Component Value Date/Time    WBC 3.5 10/04/2022 02:04 PM    RBC 4.52 10/04/2022 02:04 PM    HGB 13.9 10/04/2022 02:04 PM    HCT 39.9 10/04/2022 02:04 PM    MCV 88.3 10/04/2022 02:04 PM    RDW 12.9 10/04/2022 02:04 PM     10/04/2022 02:04 PM       CMP:   Lab Results   Component Value Date/Time     10/04/2022 02:04 PM    K 3.9 10/04/2022 02:04 PM     10/04/2022 02:04 PM    CO2 22 10/04/2022 02:04 PM    BUN 7 10/04/2022 02:04 PM    CREATININE 0.5 10/04/2022 02:04 PM    GFRAA >60 10/04/2022 02:04 PM    AGRATIO 1.5 10/04/2022 02:04 PM    LABGLOM >60 10/04/2022 02:04 PM    GLUCOSE 94 10/04/2022 02:04 PM    PROT 7.3 10/04/2022 02:04 PM    CALCIUM 9.3 10/04/2022 02:04 PM    BILITOT 0.6 10/04/2022 02:04 PM    ALKPHOS 74 10/04/2022 02:04 PM    AST 37 10/04/2022 02:04 PM    ALT 41 10/04/2022 02:04 PM       POC Tests: No results for input(s): POCGLU, POCNA, POCK, POCCL, POCBUN, POCHEMO, POCHCT in the last 72 hours.     Coags: No results found for: PROTIME, INR, APTT    HCG (If Applicable): No results found for: PREGTESTUR, PREGSERUM, HCG, HCGQUANT     ABGs: No results found for: PHART, PO2ART, OAQ5IGB, VHT8AFS, BEART, X5BBZDLS     Type & Screen (If Applicable):  No results found for: LABABO, LABRH    Drug/Infectious Status (If Applicable):  No results found for: HIV, HEPCAB    COVID-19 Screening (If Applicable): No results found for: COVID19        Anesthesia Evaluation   no history of anesthetic complications:   Airway: Mallampati: I  TM distance: >3 FB     Mouth opening: > = 3 FB   Dental:      Comment: Crowded, denies loose teeth    Pulmonary:       (-) COPD, asthma, rhonchi, wheezes, rales and not a current smoker                           Cardiovascular:  Exercise tolerance: good (>4 METS),   (+) hyperlipidemia    (-) orthopnea,  HUA, weak pulses, peripheral edema and no pulmonary hypertension      Rhythm: regular  Rate: normal                 ROS comment: EKG:  Sinus  Rhythm   RSR(V1) -nondiagnostic. Low voltage -possible pulmonary disease. ABNORMAL        Neuro/Psych:   (+) headaches: migraine headaches, depression/anxiety    (-) seizures, TIA and CVA           GI/Hepatic/Renal:   (+) GERD:,      (-) liver disease, no renal disease, bowel prep and no morbid obesity      ROS comment: + Dysphagia  + Moderate sigmoid colon diverticulosis. + Chronic constipation    Epigastric pain, resolved after starting Carafate. Endo/Other:        (-) diabetes mellitus, blood dyscrasia                ROS comment: Eczema Abdominal:         (-) obese Abdomen: soft. Vascular: Other Findings:           Anesthesia Plan      MAC     ASA 2     (NPO appropriate. Ms. Cora Castellanos denies active nausea / reflux.)        Anesthetic plan and risks discussed with patient. Plan discussed with CRNA. This pre-anesthesia assessment may be used as a history and physical.    DOS STAFF ADDENDUM:    Pt seen and examined, chart reviewed (including anesthesia, drug and allergy history). No interval changes to history and physical examination. Anesthetic plan, risks, benefits, alternatives, and personnel involved discussed with patient. Patient verbalized an understanding and agrees to proceed.       Violeta Yañez MD  October 11, 2022  11:32 AM

## 2022-10-11 NOTE — OP NOTE
EGD PROCEDURE NOTE         Esophagogastroduodenoscopy Procedure Note     Patient: Jose Guadalupe Frost MRN: [id number]   YOB: 1960 Age: 64 y.o. Sex: female       Admitting Physician: Rosa Miramontes     Primary Care Physician: Jorge Rubio MD      DATE OF PROCEDURE: 10/11/2022  PROCEDURE: Esophagogastroduodenoscopy  INDICATION: This is a 64y.o. year old female who presents today with a recent episode of epigastric pain. ENDOSCOPIST: Abel Truong MD    POSTOPERATIVE DIAGNOSIS:   1.  Mild erythematous gastropathy, nonspecific and likely nonsignificant, gastric biopsies done  2. 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 Follow-up biopsies; the patient to contact me in 1 week for results    INFORMED CONSENT:  Informed consent for esophagogastoduodenoscopy was obtained. The benefits and risks including adverse medicine reaction have been explained. The patient's questions were answered and the patient agreed to proceed. ASA:  ASA 2 - Patient with mild systemic disease with no functional limitations    SEDATION: MAC     The patient's vital signs, cardiac status, pulmonary status, abdominal status and mental status were stable for the procedure. The patient's vitals signs and respiratory function as monitored by oxygen saturation were stable throughout    Procedure Details: The Olympus videoendoscope was inserted into the mouth and carefully passed into the esophagus, through the stomach and to the distal duodenum. Antegrade and retrograde examination of the upper gi tract was carefully performed. Findings: In the proximal esophagus there is a small 5 mm sessile villous polyp consistent with a squamous papilloma. This was cold snared and removed. A remaining fragment was removed using a cold biopsy forceps. Otherwise the esophagus is normal.  The z-line is distinct without lesions or irregularities.   There is no evidence of Gracia's esophagus. The scope easily passed into the stomach. There is mild diffuse erythema of the antrum. The more proximal gastric mucosa is normal.  Random gastric biopsies were done to rule out a significant gastritis and Helicobacter pylori infection. The pylorus is patent and of normal contour. The scope easily advanced into the duodenal bulb and down to the distal duodenum.   Ante-and retrograde exam of the duodenum is normal.    Gastric or Duodenal ulcer present: No    Estimated Blood Loss: Minimal      Signed By: Zhen Rivera MD

## 2022-10-11 NOTE — H&P
Gastroenterology Outpatient History and Physical     Patient: George Hernandez MRN: [id number] Sex: female   YOB: 1960 Age: 64 y.o. Location: [address] [place] [address] 12    Date:10/11/2022  Primary Care Physician: Carmita Rubio MD         Patient: George Hernandez    Physician: Arline Agudelo MD    History of Present Illness:  colon cancer screening  Review of Systems:  Weight Loss: No  Dysphagia: No  Dyspepsia: No  History:  Past Medical History:   Diagnosis Date    Allergic rhinitis     Eczema     GERD (gastroesophageal reflux disease)     Migraine       Past Surgical History:   Procedure Laterality Date    COLONOSCOPY      SINUS SURGERY  2003, 2010    x2      Social History     Socioeconomic History    Marital status:      Spouse name: None    Number of children: 2    Years of education: None    Highest education level: None   Occupational History    Occupation: realtor   Tobacco Use    Smoking status: Never    Smokeless tobacco: Never   Substance and Sexual Activity    Alcohol use: Yes     Comment: Occasionally    Drug use: Never   Social History Narrative    Healthy diet, not exercising,      Family History   Problem Relation Age of Onset    Stroke Mother     Heart Disease Mother     Cancer Father         brain    Dementia Father     Diabetes Other     Other Sister         hypothyroid    Heart Disease Maternal Grandfather     Diabetes Paternal Grandmother     Heart Disease Paternal Grandmother      Allergies: No Known Allergies  Medications:   Prior to Admission medications    Medication Sig Start Date End Date Taking?  Authorizing Provider   sertraline (ZOLOFT) 50 MG tablet TAKE 1 TABLET BY MOUTH DAILY 10/4/22   Carmita Rubio MD   sucralfate (CARAFATE) 1 GM tablet Take 1 tablet by mouth 4 times daily as needed (for stomach/esophageal pain) 10/4/22 10/18/22  LEYLA Kilgore   Hydrocort-Pramoxine, Perianal, (ANALPRAM HC) 2.5-1 % rectal cream Place rectally 3 times daily 7/8/22 Stuart Clarke MD   atorvastatin (LIPITOR) 20 MG tablet TAKE 1 TABLET BY MOUTH EVERY DAY 4/6/22   Stuart Clarke MD   alendronate (FOSAMAX) 70 MG tablet Take 1 tablet by mouth every 7 days 12/28/21   Stuart Clarke MD   desoximetasone (TOPICORT) 0.25 % cream Apply topically 2 times daily, use sparingly 4/29/20   Stuart Clarke MD       Vital Signs: BP (!) 106/48   Pulse 74   Temp 96.8 °F (36 °C) (Temporal)   Resp 18   Ht 5' 2\" (1.575 m)   Wt 118 lb 6 oz (53.7 kg)   LMP 09/16/2011   SpO2 98%   BMI 21.65 kg/m²   Physical Exam:   Heart: regular   Lungs: non-labored breathing  Mental status:  Alert and oriented    ASA score:  ASA 2 - Patient with mild systemic disease with no functional limitations{  Mallimpati score:  2     Planned Procedure: colonoscopy    Planned Sedation: Monitored anesthesia.     Signed By: Tamiko Mccauley MD   October 11, 2022

## 2022-10-11 NOTE — ANESTHESIA POSTPROCEDURE EVALUATION
Department of Anesthesiology  Postprocedure Note    Patient: Stephanie Black  MRN: [id number]  YOB: 1960  Date of evaluation: 10/11/2022      Procedure Summary     Date: 10/11/22 Room / Location: [address] [place] [address]    Anesthesia Start: 1210 Anesthesia Stop: 1227    Procedure: EGD BIOPSY Diagnosis:       Dysphagia, unspecified type      (Dysphagia)    Surgeons: Zack Nix MD Responsible Provider: Anahy Lopez MD    Anesthesia Type: MAC ASA Status: 2          Anesthesia Type: MAC    Jaja Phase I: Jaja Score: 10    Jaja Phase II: Jaja Score: 10      Anesthesia Post Evaluation    Patient location during evaluation: PACU  Patient participation: complete - patient participated  Level of consciousness: awake  Airway patency: patent  Nausea & Vomiting: no nausea and no vomiting  Complications: no  Cardiovascular status: hemodynamically stable and blood pressure returned to baseline  Respiratory status: spontaneous ventilation, nonlabored ventilation and room air  Hydration status: stable  Comments: Ms. Diane Pritchett was seen resting comfortably following procedure. Appropriate for discharge home with .

## 2022-10-12 ENCOUNTER — OFFICE VISIT (OUTPATIENT)
Dept: FAMILY MEDICINE CLINIC | Age: 62
End: 2022-10-12
Payer: COMMERCIAL

## 2022-10-12 VITALS
WEIGHT: 119.4 LBS | BODY MASS INDEX: 21.97 KG/M2 | HEIGHT: 62 IN | HEART RATE: 84 BPM | OXYGEN SATURATION: 98 % | SYSTOLIC BLOOD PRESSURE: 94 MMHG | DIASTOLIC BLOOD PRESSURE: 64 MMHG

## 2022-10-12 DIAGNOSIS — F41.9 ANXIETY: Primary | ICD-10-CM

## 2022-10-12 DIAGNOSIS — E78.5 HYPERLIPIDEMIA, UNSPECIFIED HYPERLIPIDEMIA TYPE: ICD-10-CM

## 2022-10-12 DIAGNOSIS — K59.09 CHRONIC CONSTIPATION: ICD-10-CM

## 2022-10-12 LAB
CHOLESTEROL, TOTAL: 179 MG/DL (ref 0–199)
HDLC SERPL-MCNC: 49 MG/DL (ref 40–60)
LDL CHOLESTEROL CALCULATED: 107 MG/DL
TRIGL SERPL-MCNC: 114 MG/DL (ref 0–150)
VLDLC SERPL CALC-MCNC: 23 MG/DL

## 2022-10-12 PROCEDURE — 99214 OFFICE O/P EST MOD 30 MIN: CPT | Performed by: FAMILY MEDICINE

## 2022-10-12 RX ORDER — DESOXIMETASONE 2.5 MG/G
CREAM TOPICAL
Qty: 30 G | Refills: 1 | Status: SHIPPED | OUTPATIENT
Start: 2022-10-12

## 2022-10-12 RX ORDER — SUCRALFATE 1 G/1
1 TABLET ORAL 4 TIMES DAILY PRN
Qty: 30 TABLET | Refills: 0 | Status: SHIPPED | OUTPATIENT
Start: 2022-10-12 | End: 2022-10-26

## 2022-10-12 RX ORDER — HYDROCORTISONE ACETATE PRAMOXINE HCL 2.5; 1 G/100G; G/100G
CREAM TOPICAL 3 TIMES DAILY
Qty: 60 G | Refills: 0 | Status: SHIPPED | OUTPATIENT
Start: 2022-10-12

## 2022-10-12 RX ORDER — OMEPRAZOLE 20 MG/1
20 CAPSULE, DELAYED RELEASE ORAL DAILY
COMMUNITY

## 2022-10-12 RX ORDER — ATORVASTATIN CALCIUM 20 MG/1
TABLET, FILM COATED ORAL
Qty: 90 TABLET | Refills: 1 | Status: SHIPPED | OUTPATIENT
Start: 2022-10-12

## 2022-10-12 NOTE — PROGRESS NOTES
Deja Trejo presents for   Chief Complaint   Patient presents with    Hyperlipidemia     Pt is here for 6 month follow up and FBW        ASSESSMENT:   Diagnosis Orders   1. Anxiety, stable continue Zoloft 50 mg sertraline (ZOLOFT) 50 MG tablet      2. Hyperlipidemia, unspecified hyperlipidemia type, labs are pending continue Lipitor 20 mg atorvastatin (LIPITOR) 20 MG tablet    Lipid Panel      3. Chronic constipation, patient referred to GI, Dr. Sarah Bradford for a formal consult. She had an esophagogastroduodenoscopy performed yesterday for abdominal pain. Plan:  1)  Medication: continue current medication regimen unchanged, medications are working and tolerated, continue as listed  2)  Recheck in 6 months, sooner should new symptoms or problems arise. 3) LLR          SUBJECTIVE:  Deja Trejo is a 64 y.o. female who presents for evaluation of anxiety and hyperlipidemia. She indicates that she is feeling well and denies any symptoms referable to her elevated blood lipids   specifically denies chest pain, palpitations, dyspnea, orthopnea, PND or peripheral edema or neuro sx. No anorexia, arthralgia, or leg cramps noted. Current medication regimen is as listed below. She denies any side effects of medication, and has been taking it regularly. Lab Results   Component Value Date    LDLCALC 98 04/11/2022       Last week, patient had an episode of severe abdominal pain and went to the emergency room. CT scan was done no etiology found. Patient thought that her pain in her upper abdomen was a result of constipation. She has been taking Prilosec 20 mg on a regular basis. They gave her prescription for Carafate and that dramatically helped her symptoms. She had an esophagogastroduodenoscopy done yesterday which showed some gastric erythema and esophageal polyps. Patient has not officially seen Dr. Sarah Bradford in consult for her GI related issues.   I requested that she do that formally today to further assist with her chronic constipation and GI related issues. Her anxiety has been well controlled on Zoloft 50 mg. She is fasting today for cholesterol blood work. She will continue on Lipitor 20 mg    Current Outpatient Medications   Medication Sig Dispense Refill    desoximetasone (TOPICORT) 0.25 % cream Apply topically 2 times daily, use sparingly 30 g 1    Hydrocort-Pramoxine, Perianal, (ANALPRAM HC) 2.5-1 % rectal cream Place rectally 3 times daily 60 g 0    sucralfate (CARAFATE) 1 GM tablet Take 1 tablet by mouth 4 times daily as needed (for stomach/esophageal pain) 30 tablet 0    omeprazole (PRILOSEC) 20 MG delayed release capsule Take 20 mg by mouth daily      atorvastatin (LIPITOR) 20 MG tablet 1 po daily 90 tablet 1    sertraline (ZOLOFT) 50 MG tablet Take 1 tablet by mouth daily 90 tablet 1    alendronate (FOSAMAX) 70 MG tablet Take 1 tablet by mouth every 7 days 4 tablet 10     No current facility-administered medications for this visit. No Known Allergies    Social History     Tobacco Use    Smoking status: Never    Smokeless tobacco: Never   Substance Use Topics    Alcohol use: Yes     Comment: Occasionally       OBJECTIVE:   BP 94/64 (Site: Left Upper Arm, Position: Sitting)   Pulse 84   Ht 5' 2\" (1.575 m)   Wt 119 lb 6.4 oz (54.2 kg)   LMP 09/16/2011   SpO2 98%   BMI 21.84 kg/m²   NAD  Neck no bruit or JVD  S1 and S2 normal, no murmurs, clicks, gallops or rubs. Regular rate and rhythm. Chest is clear; no wheezes or rales. No edema or JVD. Abdomen soft nontender  Neuro alert, no CN or motor deficits  Psych nl mood, thought and judgement                EMR Dragon/transcription disclaimer:  Much of this encounter note is electronic transcription/translation of spoken language to printed texts. The electronic translation of spoken language may be erroneous, or at times, nonsensical words or phrases may be inadvertently transcribed.   Although I have reviewed the note for such errors, some may still exist. Rifampin Pregnancy And Lactation Text: This medication is Pregnancy Category C and it isn't know if it is safe during pregnancy. It is also excreted in breast milk and should not be used if you are breast feeding.

## 2022-10-13 ENCOUNTER — TELEPHONE (OUTPATIENT)
Dept: ADMINISTRATIVE | Age: 62
End: 2022-10-13

## 2022-10-13 NOTE — TELEPHONE ENCOUNTER
Submitted PA for Desoximetasone 0.25% cream, Key: [id number]. Status: Approved, Coverage Starts on: 10/13/2022 12:00:00 AM, Coverage Ends on: 10/13/2023. Please notify patient. Thank you.

## 2022-10-15 NOTE — RESULT ENCOUNTER NOTE
Egd done October 11 for an episode of abdominal pain showed a squamous papilloma in the esophagus that was removed. Otherwise the examination was unremarkable. I have recommended the patient follow-up in the office to consider further evaluation as needed and address her constipation.

## 2022-10-17 RX ORDER — SUCRALFATE 1 G/1
TABLET ORAL
Qty: 120 TABLET | Refills: 5 | OUTPATIENT
Start: 2022-10-17

## 2022-10-17 NOTE — TELEPHONE ENCOUNTER
Yudith Streeter is requesting refill(s) sucralfate  Last OV 10/12/22 (pertaining to medication)  LR 10/12/22 (per medication requested)  Next office visit scheduled or attempted Yes   If no, reason:  4/12/23

## 2022-12-13 DIAGNOSIS — F41.9 ANXIETY: ICD-10-CM

## 2022-12-13 NOTE — TELEPHONE ENCOUNTER
This medication was sent to the pharmacy on 10/12/22 as a 90 day supply with 1 refill. This refill is not needed.

## 2022-12-20 NOTE — PROGRESS NOTES
[facility] ENDOSCOPY COLONOSCOPY PRE-OPERATIVE INSTRUCTIONS    Procedure date__12/28/22_______Arrival time___700_________        Surgery time___800_________       Clear liquids the day before the procedure. Do not eat or drink anything within 5 hours of your procedure. This includes water chewing gum, mints and ice chips. You may brush your teeth and gargle the morning of your surgery, but do not swallow the water    You may be asked to stop blood thinners such as Coumadin, Plavix, Fragmin, Lovenox, etc., or any anti-inflammatories such as:  Aspirin, Ibuprofen, Advil, Naproxen prior to your procedure. We also ask that you stop any OTC medications such as fish oil, vitamin E, glucosamine, garlic, Multivitamins, COQ 10, etc.    You must make arrangements for a responsible adult to arrive with you and stay in our waiting area during your procedure. They will also need to take you home after your procedure. For your safety you will not be allowed to leave alone or drive yourself home. Also for your safety, it is strongly suggested that someone stay with you the first 24 hours after your procedure. For your comfort, please wear simple loose fitting clothing to the center. Please do not bring valuables. If you have a living will and a durable power of  for healthcare, please bring in a copy.      You will need to bring a photo ID and insurance card    Our goal is to provide you with excellent care so if you have any questions, please contact us at the [facility] at [phone]         Please note these are generalized instructions for all colonoscopy cases, you may be provided with more specific instructions if necessary

## 2022-12-27 ENCOUNTER — ANESTHESIA EVENT (OUTPATIENT)
Dept: ENDOSCOPY | Age: 62
End: 2022-12-27
Payer: COMMERCIAL

## 2022-12-28 ENCOUNTER — ANESTHESIA (OUTPATIENT)
Dept: ENDOSCOPY | Age: 62
End: 2022-12-28
Payer: COMMERCIAL

## 2022-12-28 ENCOUNTER — HOSPITAL ENCOUNTER (OUTPATIENT)
Age: 62
Setting detail: OUTPATIENT SURGERY
Discharge: HOME | End: 2022-12-28
Attending: INTERNAL MEDICINE | Admitting: INTERNAL MEDICINE
Payer: COMMERCIAL

## 2022-12-28 VITALS
SYSTOLIC BLOOD PRESSURE: 108 MMHG | OXYGEN SATURATION: 100 % | HEIGHT: 63 IN | BODY MASS INDEX: 20.55 KG/M2 | WEIGHT: 116 LBS | TEMPERATURE: 97.1 F | RESPIRATION RATE: 16 BRPM | HEART RATE: 66 BPM | DIASTOLIC BLOOD PRESSURE: 53 MMHG

## 2022-12-28 PROCEDURE — 7100000011 HC PHASE II RECOVERY - ADDTL 15 MIN: Performed by: INTERNAL MEDICINE

## 2022-12-28 PROCEDURE — 3609027000 HC COLONOSCOPY: Performed by: INTERNAL MEDICINE

## 2022-12-28 PROCEDURE — 3700000000 HC ANESTHESIA ATTENDED CARE: Performed by: INTERNAL MEDICINE

## 2022-12-28 PROCEDURE — 6360000002 HC RX W HCPCS: Performed by: ANESTHESIOLOGY

## 2022-12-28 PROCEDURE — 7100000010 HC PHASE II RECOVERY - FIRST 15 MIN: Performed by: INTERNAL MEDICINE

## 2022-12-28 PROCEDURE — 3700000001 HC ADD 15 MINUTES (ANESTHESIA): Performed by: INTERNAL MEDICINE

## 2022-12-28 PROCEDURE — 2580000003 HC RX 258: Performed by: STUDENT IN AN ORGANIZED HEALTH CARE EDUCATION/TRAINING PROGRAM

## 2022-12-28 PROCEDURE — 2500000003 HC RX 250 WO HCPCS: Performed by: ANESTHESIOLOGY

## 2022-12-28 RX ORDER — DIPHENHYDRAMINE HYDROCHLORIDE 50 MG/ML
12.5 INJECTION INTRAMUSCULAR; INTRAVENOUS
Status: CANCELLED | OUTPATIENT
Start: 2022-12-28 | End: 2022-12-29

## 2022-12-28 RX ORDER — SODIUM CHLORIDE 0.9 % (FLUSH) 0.9 %
5-40 SYRINGE (ML) INJECTION PRN
Status: DISCONTINUED | OUTPATIENT
Start: 2022-12-28 | End: 2022-12-28 | Stop reason: HOSPADM

## 2022-12-28 RX ORDER — PROPOFOL 10 MG/ML
INJECTION, EMULSION INTRAVENOUS CONTINUOUS PRN
Status: DISCONTINUED | OUTPATIENT
Start: 2022-12-28 | End: 2022-12-28 | Stop reason: SDUPTHER

## 2022-12-28 RX ORDER — ONDANSETRON 2 MG/ML
4 INJECTION INTRAMUSCULAR; INTRAVENOUS
Status: CANCELLED | OUTPATIENT
Start: 2022-12-28 | End: 2022-12-29

## 2022-12-28 RX ORDER — SODIUM CHLORIDE 9 MG/ML
INJECTION, SOLUTION INTRAVENOUS PRN
Status: CANCELLED | OUTPATIENT
Start: 2022-12-28

## 2022-12-28 RX ORDER — SODIUM CHLORIDE 0.9 % (FLUSH) 0.9 %
5-40 SYRINGE (ML) INJECTION EVERY 12 HOURS SCHEDULED
Status: DISCONTINUED | OUTPATIENT
Start: 2022-12-28 | End: 2022-12-28 | Stop reason: HOSPADM

## 2022-12-28 RX ORDER — LIDOCAINE HYDROCHLORIDE 20 MG/ML
INJECTION, SOLUTION INFILTRATION; PERINEURAL PRN
Status: DISCONTINUED | OUTPATIENT
Start: 2022-12-28 | End: 2022-12-28 | Stop reason: SDUPTHER

## 2022-12-28 RX ORDER — SODIUM CHLORIDE 9 MG/ML
INJECTION, SOLUTION INTRAVENOUS PRN
Status: DISCONTINUED | OUTPATIENT
Start: 2022-12-28 | End: 2022-12-28 | Stop reason: HOSPADM

## 2022-12-28 RX ORDER — MEPERIDINE HYDROCHLORIDE 25 MG/ML
12.5 INJECTION INTRAMUSCULAR; INTRAVENOUS; SUBCUTANEOUS EVERY 5 MIN PRN
Status: CANCELLED | OUTPATIENT
Start: 2022-12-28

## 2022-12-28 RX ORDER — PROPOFOL 10 MG/ML
INJECTION, EMULSION INTRAVENOUS PRN
Status: DISCONTINUED | OUTPATIENT
Start: 2022-12-28 | End: 2022-12-28 | Stop reason: SDUPTHER

## 2022-12-28 RX ORDER — SODIUM CHLORIDE 0.9 % (FLUSH) 0.9 %
5-40 SYRINGE (ML) INJECTION PRN
Status: CANCELLED | OUTPATIENT
Start: 2022-12-28

## 2022-12-28 RX ORDER — SODIUM CHLORIDE 0.9 % (FLUSH) 0.9 %
5-40 SYRINGE (ML) INJECTION EVERY 12 HOURS SCHEDULED
Status: CANCELLED | OUTPATIENT
Start: 2022-12-28

## 2022-12-28 RX ORDER — FENTANYL CITRATE 50 UG/ML
25 INJECTION, SOLUTION INTRAMUSCULAR; INTRAVENOUS EVERY 5 MIN PRN
Status: CANCELLED | OUTPATIENT
Start: 2022-12-28

## 2022-12-28 RX ORDER — LABETALOL HYDROCHLORIDE 5 MG/ML
5 INJECTION, SOLUTION INTRAVENOUS
Status: CANCELLED | OUTPATIENT
Start: 2022-12-28

## 2022-12-28 RX ADMIN — PROPOFOL 150 MCG/KG/MIN: 10 INJECTION, EMULSION INTRAVENOUS at 08:21

## 2022-12-28 RX ADMIN — SODIUM CHLORIDE 100 ML/HR: 9 INJECTION, SOLUTION INTRAVENOUS at 07:21

## 2022-12-28 RX ADMIN — PROPOFOL 50 MG: 10 INJECTION, EMULSION INTRAVENOUS at 08:21

## 2022-12-28 RX ADMIN — LIDOCAINE HYDROCHLORIDE 100 MG: 20 INJECTION, SOLUTION INFILTRATION; PERINEURAL at 08:21

## 2022-12-28 ASSESSMENT — ENCOUNTER SYMPTOMS: SHORTNESS OF BREATH: 0

## 2022-12-28 ASSESSMENT — PAIN SCALES - WONG BAKER: WONGBAKER_NUMERICALRESPONSE: 0

## 2022-12-28 ASSESSMENT — LIFESTYLE VARIABLES: SMOKING_STATUS: 0

## 2022-12-28 ASSESSMENT — PAIN - FUNCTIONAL ASSESSMENT: PAIN_FUNCTIONAL_ASSESSMENT: NONE - DENIES PAIN

## 2022-12-28 NOTE — H&P
Gastroenterology Outpatient History and Physical     Patient: Erinn Shea MRN: [id number] Sex: female   YOB: 1960 Age: 58 y.o. Location: [address] [place] [address]    Date:12/28/2022  Primary Care Physician: Real Cote MD         Patient: Erinn Shea    Physician: Eliseo Marques MD    History of Present Illness: colon cancer screening  Review of Systems:  Weight Loss: No  Dysphagia: No  Dyspepsia: No  History:  Past Medical History:   Diagnosis Date    Allergic rhinitis     Eczema     GERD (gastroesophageal reflux disease)     Hyperlipidemia     Migraine       Past Surgical History:   Procedure Laterality Date    COLONOSCOPY      SINUS SURGERY  2003, 2010    x2    UPPER GASTROINTESTINAL ENDOSCOPY N/A 10/11/2022    EGD BIOPSY performed by Eliseo Marques MD at [address] History     Socioeconomic History    Marital status:      Spouse name: None    Number of children: 2    Years of education: None    Highest education level: None   Occupational History    Occupation: realtor   Tobacco Use    Smoking status: Never    Smokeless tobacco: Never   Vaping Use    Vaping Use: Never used   Substance and Sexual Activity    Alcohol use: Yes     Comment: Occasionally    Drug use: Never   Social History Narrative    Healthy diet, not exercising,      Family History   Problem Relation Age of Onset    Stroke Mother     Heart Disease Mother     Cancer Father         brain    Dementia Father     Diabetes Other     Other Sister         hypothyroid    Heart Disease Maternal Grandfather     Diabetes Paternal Grandmother     Heart Disease Paternal Grandmother      Allergies: No Known Allergies  Medications:   Prior to Admission medications    Medication Sig Start Date End Date Taking?  Authorizing Provider   desoximetasone (TOPICORT) 0.25 % cream Apply topically 2 times daily, use sparingly 10/12/22   Real Cote MD   Hydrocort-Pramoxine, Perianal, (ANALPRAM HC) 2.5-1 % rectal cream Place rectally 3 times daily 10/12/22   Loni Rasheed MD   sucralfate (CARAFATE) 1 GM tablet Take 1 tablet by mouth 4 times daily as needed (for stomach/esophageal pain) 10/12/22 10/26/22  Loni Rasheed MD   omeprazole (PRILOSEC) 20 MG delayed release capsule Take 20 mg by mouth daily    Historical Provider, MD   atorvastatin (LIPITOR) 20 MG tablet 1 po daily 10/12/22   Loni Rasheed MD   sertraline (ZOLOFT) 50 MG tablet Take 1 tablet by mouth daily 10/12/22   Loni Rasheed MD   alendronate (FOSAMAX) 70 MG tablet Take 1 tablet by mouth every 7 days 12/28/21   Loni Rasheed MD       Vital Signs: /83   Pulse 87   Temp 97.6 °F (36.4 °C) (Temporal)   Resp 16   Ht 5' 3\" (1.6 m)   Wt 116 lb (52.6 kg)   LMP 09/16/2011   SpO2 100%   BMI 20.55 kg/m²   Physical Exam:   Heart: regular     Lungs: non-labored breathing  Mental status:  Alert and oriented    ASA score:  ASA 2 - Patient with mild systemic disease with no functional limitations{  Mallimpati score:  2     Planned Procedure: colonoscopy    Planned Sedation: Conscious sedation / Monitored anesthesia.     Signed By: Neomi Merlin, MD   December 28, 2022

## 2022-12-28 NOTE — ANESTHESIA POSTPROCEDURE EVALUATION
Department of Anesthesiology  Postprocedure Note    Patient: Jose Guadalupe Frost  MRN: [id number]  YOB: 1960  Date of evaluation: 12/28/2022      Procedure Summary     Date: 12/28/22 Room / Location: [address] [place] [address]    Anesthesia Start: 6618 Anesthesia Stop: 1133    Procedure: COLORECTAL CANCER SCREENING, NOT HIGH RISK Diagnosis:       Screen for colon cancer      (Screen for colon cancer)    Surgeons: Abel Truong MD Responsible Provider: Javier Mcmillan MD    Anesthesia Type: MAC ASA Status: 2          Anesthesia Type: No value filed.     Jaja Phase I: Jaja Score: 10    Jaja Phase II: Jaja Score: 10      Anesthesia Post Evaluation    Patient location during evaluation: PACU  Patient participation: complete - patient participated  Level of consciousness: awake  Airway patency: patent  Nausea & Vomiting: no nausea  Complications: no  Cardiovascular status: hemodynamically stable  Respiratory status: acceptable  Hydration status: euvolemic  Multimodal analgesia pain management approach

## 2022-12-28 NOTE — OP NOTE
COLONOSCOPY     Patient: Lilian Yeung MRN: [id number]   YOB: 1960 Age: 58 y.o. Sex: female       Admitting Physician: Shlomo Angela     Primary Care Physician: Oscar Brian MD      DATE OF PROCEDURE: 12/28/2022  PROCEDURE: Colonoscopy    PREOPERATIVE DIAGNOSIS: Screen for colon cancer  HPI: This is a 58y.o. year old female who presents today for colon cancer screening and screening colonoscopy. ENDOSCOPIST: Breanna Cuevas MD    POSTOPERATIVE DIAGNOSIS:    1. Sigmoid diverticulosis  2. Hemorrhoids    PLAN:   1.  Screening colonoscopy in 10 years    INFORMED CONSENT:  Informed consent for colonoscopy was obtained. The benefits and risks including adverse medicine reaction and perforation have been explained. The patient's questions were answered and the patient agreed to proceed. ASA: ASA 2 - Patient with mild systemic disease with no functional limitations     SEDATION: MAC    The patient's vital signs, cardiac status, pulmonary status, abdominal status and mental status were stable for the procedure. The patient's vital signs and respiratory function as monitored by oxygen saturation remained stable. COLON PREPARATION:  The patient was given a split colon preparation and the preparation was adequate. Procedure Details: An anal exam was performed and this was unremarkable. A digital rectal exam was performed and no masses palpated. The Olympus videocolonoscope  was inserted in the rectum and carefully advanced to the cecum as identified by IC valve, crow's foot appearance and appendix. The cecum was photodocumented. The colonoscope was slowly withdrawn and retrograde examination of the colon was carefully performed with inspection around and between folds. The ascending colon and cecum were intubated twice with repeat antegrade and retrograde examination. Retroflexion in the rectum was performed. Cecum Intubated: Yes    Findings:      There are diverticula in the sigmoid colon. On retroflexion of the scope in the rectum, there are hemorrhoids. There are no polyps or tumors.     Estimated Blood Loss:  None  Complications: None    Signed By: Audra Morales MD

## 2022-12-28 NOTE — DISCHARGE INSTRUCTIONS

## 2022-12-28 NOTE — ANESTHESIA PRE PROCEDURE
Department of Anesthesiology  Preprocedure Note       Name:  Geno Mills   Age:  58 y.o.  :  1960                                          MRN:  [id number]         Date:  2022      Surgeon: Clint Bettencourt):  Dru Ho MD    Procedure: Procedure(s):  COLORECTAL CANCER SCREENING, NOT HIGH RISK    Medications prior to admission:   Prior to Admission medications    Medication Sig Start Date End Date Taking?  Authorizing Provider   desoximetasone (TOPICORT) 0.25 % cream Apply topically 2 times daily, use sparingly 10/12/22   Derek Kay MD   Hydrocort-Pramoxine, Perianal, (ANALPRAM HC) 2.5-1 % rectal cream Place rectally 3 times daily 10/12/22   Derek Kay MD   sucralfate (CARAFATE) 1 GM tablet Take 1 tablet by mouth 4 times daily as needed (for stomach/esophageal pain) 10/12/22 10/26/22  Derek Kay MD   omeprazole (PRILOSEC) 20 MG delayed release capsule Take 20 mg by mouth daily    Historical Provider, MD   atorvastatin (LIPITOR) 20 MG tablet 1 po daily 10/12/22   Derek Kay MD   sertraline (ZOLOFT) 50 MG tablet Take 1 tablet by mouth daily 10/12/22   Derek Kay MD   alendronate (FOSAMAX) 70 MG tablet Take 1 tablet by mouth every 7 days 21   Derek Kay MD       Current medications:    Current Facility-Administered Medications   Medication Dose Route Frequency Provider Last Rate Last Admin    sodium chloride flush 0.9 % injection 5-40 mL  5-40 mL IntraVENous 2 times per day Devendra Dong MD        sodium chloride flush 0.9 % injection 5-40 mL  5-40 mL IntraVENous PRN Devendra Dong MD        0.9 % sodium chloride infusion   IntraVENous PRN Devendra Dong  mL/hr at 22 0721 100 mL/hr at 22 0721       Allergies:  No Known Allergies    Problem List:    Patient Active Problem List   Diagnosis Code    Migraine G43.909    Allergic rhinitis J30.9    Hyperlipidemia E78.5    Anxiety F41.9    Eczema L30.9    Chronic constipation K59.09       Past Medical History:        Diagnosis Date    Allergic rhinitis     Eczema     GERD (gastroesophageal reflux disease)     Hyperlipidemia     Migraine        Past Surgical History:        Procedure Laterality Date    COLONOSCOPY      SINUS SURGERY  2003, 2010    x2    UPPER GASTROINTESTINAL ENDOSCOPY N/A 10/11/2022    EGD BIOPSY performed by Andrei Walden MD at [address] [place] [address] History:    Social History     Tobacco Use    Smoking status: Never    Smokeless tobacco: Never   Substance Use Topics    Alcohol use: Yes     Comment: Occasionally                                Counseling given: Not Answered      Vital Signs (Current):   Vitals:    12/20/22 0908 12/28/22 0712   BP:  109/83   Pulse:  87   Resp:  16   Temp:  97.6 °F (36.4 °C)   TempSrc:  Temporal   SpO2:  100%   Weight: 122 lb (55.3 kg) 116 lb (52.6 kg)   Height: 5' 3\" (1.6 m) 5' 3\" (1.6 m)                                              BP Readings from Last 3 Encounters:   12/28/22 109/83   10/12/22 94/64   10/11/22 109/73       NPO Status: Time of last liquid consumption: 0330                        Time of last solid consumption: 1800                        Date of last liquid consumption: 12/28/22                        Date of last solid food consumption: 12/26/22    BMI:   Wt Readings from Last 3 Encounters:   12/28/22 116 lb (52.6 kg)   10/12/22 119 lb 6.4 oz (54.2 kg)   10/11/22 118 lb 6 oz (53.7 kg)     Body mass index is 20.55 kg/m².     CBC:   Lab Results   Component Value Date/Time    WBC 3.5 10/04/2022 02:04 PM    RBC 4.52 10/04/2022 02:04 PM    HGB 13.9 10/04/2022 02:04 PM    HCT 39.9 10/04/2022 02:04 PM    MCV 88.3 10/04/2022 02:04 PM    RDW 12.9 10/04/2022 02:04 PM     10/04/2022 02:04 PM       CMP:   Lab Results   Component Value Date/Time     10/04/2022 02:04 PM    K 3.9 10/04/2022 02:04 PM     10/04/2022 02:04 PM    CO2 22 10/04/2022 02:04 PM    BUN 7 10/04/2022 02:04 PM    CREATININE 0.5 10/04/2022 02:04 PM    GFRAA >60 10/04/2022 02:04 PM    AGRATIO 1.5 10/04/2022 02:04 PM    LABGLOM >60 10/04/2022 02:04 PM    GLUCOSE 94 10/04/2022 02:04 PM    PROT 7.3 10/04/2022 02:04 PM    CALCIUM 9.3 10/04/2022 02:04 PM    BILITOT 0.6 10/04/2022 02:04 PM    ALKPHOS 74 10/04/2022 02:04 PM    AST 37 10/04/2022 02:04 PM    ALT 41 10/04/2022 02:04 PM       POC Tests: No results for input(s): POCGLU, POCNA, POCK, POCCL, POCBUN, POCHEMO, POCHCT in the last 72 hours. Coags: No results found for: PROTIME, INR, APTT    HCG (If Applicable): No results found for: PREGTESTUR, PREGSERUM, HCG, HCGQUANT     ABGs: No results found for: PHART, PO2ART, GQM8REU, YZS8QYC, BEART, M9ZWVTWM     Type & Screen (If Applicable):  No results found for: LABABO, LABRH    Drug/Infectious Status (If Applicable):  No results found for: HIV, HEPCAB    COVID-19 Screening (If Applicable): No results found for: COVID19        Anesthesia Evaluation  Patient summary reviewed no history of anesthetic complications:   Airway: Mallampati: II  TM distance: >3 FB   Neck ROM: full  Mouth opening: > = 3 FB   Dental: normal exam         Pulmonary:Negative Pulmonary ROS and normal exam  breath sounds clear to auscultation      (-) shortness of breath and not a current smoker                           Cardiovascular:  Exercise tolerance: good (>4 METS),   (+) hyperlipidemia        Rhythm: regular  Rate: normal                    Neuro/Psych:   (+) headaches:, depression/anxiety             GI/Hepatic/Renal:   (+) GERD:,           Endo/Other: Negative Endo/Other ROS                    Abdominal:             Vascular: negative vascular ROS. Other Findings:           Anesthesia Plan      MAC     ASA 2       Induction: intravenous. Anesthetic plan and risks discussed with patient. Plan discussed with CRNA.     Attending anesthesiologist reviewed and agrees with Johanny Schuler MD   12/28/2022

## 2023-09-05 DIAGNOSIS — F41.9 ANXIETY: ICD-10-CM

## 2023-09-06 NOTE — TELEPHONE ENCOUNTER
Aaron Cross is requesting refill(s) sertraline  Last OV 4/12/23 (pertaining to medication)  LR 4/12/23 (per medication requested)  Next office visit scheduled or attempted Yes   If no, reason:  10/13/23 (V5) oriented

## 2023-10-16 ENCOUNTER — OFFICE VISIT (OUTPATIENT)
Dept: FAMILY MEDICINE CLINIC | Age: 63
End: 2023-10-16
Payer: COMMERCIAL

## 2023-10-16 VITALS
HEIGHT: 63 IN | HEART RATE: 75 BPM | OXYGEN SATURATION: 98 % | DIASTOLIC BLOOD PRESSURE: 64 MMHG | BODY MASS INDEX: 20.91 KG/M2 | SYSTOLIC BLOOD PRESSURE: 108 MMHG | WEIGHT: 118 LBS

## 2023-10-16 DIAGNOSIS — Z23 NEEDS FLU SHOT: ICD-10-CM

## 2023-10-16 DIAGNOSIS — F41.9 ANXIETY: ICD-10-CM

## 2023-10-16 DIAGNOSIS — E78.5 HYPERLIPIDEMIA, UNSPECIFIED HYPERLIPIDEMIA TYPE: Primary | ICD-10-CM

## 2023-10-16 DIAGNOSIS — M81.0 OSTEOPOROSIS, UNSPECIFIED OSTEOPOROSIS TYPE, UNSPECIFIED PATHOLOGICAL FRACTURE PRESENCE: ICD-10-CM

## 2023-10-16 LAB
ALBUMIN SERPL-MCNC: 4.6 G/DL (ref 3.4–5)
ALBUMIN/GLOB SERPL: 2 {RATIO} (ref 1.1–2.2)
ALP SERPL-CCNC: 69 U/L (ref 40–129)
ALT SERPL-CCNC: 31 U/L (ref 10–40)
ANION GAP SERPL CALCULATED.3IONS-SCNC: 10 MMOL/L (ref 3–16)
AST SERPL-CCNC: 33 U/L (ref 15–37)
BILIRUB SERPL-MCNC: 0.7 MG/DL (ref 0–1)
BUN SERPL-MCNC: 12 MG/DL (ref 7–20)
CALCIUM SERPL-MCNC: 8.8 MG/DL (ref 8.3–10.6)
CHLORIDE SERPL-SCNC: 102 MMOL/L (ref 99–110)
CHOLEST SERPL-MCNC: 195 MG/DL (ref 0–199)
CO2 SERPL-SCNC: 27 MMOL/L (ref 21–32)
CREAT SERPL-MCNC: 0.6 MG/DL (ref 0.6–1.2)
GFR SERPLBLD CREATININE-BSD FMLA CKD-EPI: >60 ML/MIN/{1.73_M2}
GLUCOSE SERPL-MCNC: 87 MG/DL (ref 70–99)
HDLC SERPL-MCNC: 53 MG/DL (ref 40–60)
LDLC SERPL CALC-MCNC: 120 MG/DL
POTASSIUM SERPL-SCNC: 3.9 MMOL/L (ref 3.5–5.1)
PROT SERPL-MCNC: 6.9 G/DL (ref 6.4–8.2)
SODIUM SERPL-SCNC: 139 MMOL/L (ref 136–145)
TRIGL SERPL-MCNC: 112 MG/DL (ref 0–150)
VLDLC SERPL CALC-MCNC: 22 MG/DL

## 2023-10-16 PROCEDURE — 90674 CCIIV4 VAC NO PRSV 0.5 ML IM: CPT | Performed by: FAMILY MEDICINE

## 2023-10-16 PROCEDURE — 99214 OFFICE O/P EST MOD 30 MIN: CPT | Performed by: FAMILY MEDICINE

## 2023-10-16 PROCEDURE — 90471 IMMUNIZATION ADMIN: CPT | Performed by: FAMILY MEDICINE

## 2023-10-16 RX ORDER — DESOXIMETASONE 2.5 MG/G
CREAM TOPICAL
Qty: 30 G | Refills: 1 | Status: SHIPPED | OUTPATIENT
Start: 2023-10-16

## 2023-10-16 RX ORDER — HYDROCORTISONE ACETATE PRAMOXINE HCL 2.5; 1 G/100G; G/100G
CREAM TOPICAL 3 TIMES DAILY
Qty: 60 G | Refills: 0 | Status: SHIPPED | OUTPATIENT
Start: 2023-10-16

## 2023-10-16 RX ORDER — ATORVASTATIN CALCIUM 20 MG/1
TABLET, FILM COATED ORAL
Qty: 90 TABLET | Refills: 1 | Status: SHIPPED | OUTPATIENT
Start: 2023-10-16

## 2023-10-16 RX ORDER — ALENDRONATE SODIUM 70 MG/1
70 TABLET ORAL
Qty: 12 TABLET | Refills: 1 | Status: SHIPPED | OUTPATIENT
Start: 2023-10-16

## 2023-10-16 NOTE — PROGRESS NOTES
times, nonsensical words or phrases may be inadvertently transcribed.   Although I have reviewed the note for such errors, some may still exist.

## 2023-11-20 ENCOUNTER — HOSPITAL ENCOUNTER (OUTPATIENT)
Dept: WOMENS IMAGING | Age: 63
Discharge: HOME | End: 2023-11-20
Payer: COMMERCIAL

## 2023-11-20 VITALS — HEIGHT: 62 IN | BODY MASS INDEX: 21.71 KG/M2 | WEIGHT: 118 LBS

## 2023-11-20 DIAGNOSIS — Z12.31 BREAST CANCER SCREENING BY MAMMOGRAM: ICD-10-CM

## 2023-11-20 PROCEDURE — 77063 BREAST TOMOSYNTHESIS BI: CPT

## 2024-04-09 ASSESSMENT — PATIENT HEALTH QUESTIONNAIRE - PHQ9
SUM OF ALL RESPONSES TO PHQ QUESTIONS 1-9: 1
2. FEELING DOWN, DEPRESSED OR HOPELESS: SEVERAL DAYS
SUM OF ALL RESPONSES TO PHQ QUESTIONS 1-9: 1
SUM OF ALL RESPONSES TO PHQ QUESTIONS 1-9: 1
SUM OF ALL RESPONSES TO PHQ9 QUESTIONS 1 & 2: 1
1. LITTLE INTEREST OR PLEASURE IN DOING THINGS: NOT AT ALL
1. LITTLE INTEREST OR PLEASURE IN DOING THINGS: NOT AT ALL
SUM OF ALL RESPONSES TO PHQ9 QUESTIONS 1 & 2: 1
SUM OF ALL RESPONSES TO PHQ QUESTIONS 1-9: 1
2. FEELING DOWN, DEPRESSED OR HOPELESS: SEVERAL DAYS

## 2024-04-12 ENCOUNTER — OFFICE VISIT (OUTPATIENT)
Dept: FAMILY MEDICINE CLINIC | Age: 64
End: 2024-04-12
Payer: COMMERCIAL

## 2024-04-12 VITALS
DIASTOLIC BLOOD PRESSURE: 64 MMHG | SYSTOLIC BLOOD PRESSURE: 110 MMHG | HEIGHT: 62 IN | WEIGHT: 114.6 LBS | HEART RATE: 95 BPM | BODY MASS INDEX: 21.09 KG/M2 | OXYGEN SATURATION: 98 %

## 2024-04-12 DIAGNOSIS — F41.9 ANXIETY: ICD-10-CM

## 2024-04-12 DIAGNOSIS — K59.09 CHRONIC CONSTIPATION: ICD-10-CM

## 2024-04-12 DIAGNOSIS — E78.5 HYPERLIPIDEMIA, UNSPECIFIED HYPERLIPIDEMIA TYPE: Primary | ICD-10-CM

## 2024-04-12 DIAGNOSIS — S39.012A STRAIN OF LUMBAR REGION, INITIAL ENCOUNTER: ICD-10-CM

## 2024-04-12 DIAGNOSIS — M81.0 OSTEOPOROSIS, UNSPECIFIED OSTEOPOROSIS TYPE, UNSPECIFIED PATHOLOGICAL FRACTURE PRESENCE: ICD-10-CM

## 2024-04-12 LAB
ALBUMIN SERPL-MCNC: 4.7 G/DL (ref 3.4–5)
ALBUMIN/GLOB SERPL: 1.8 {RATIO} (ref 1.1–2.2)
ALP SERPL-CCNC: 61 U/L (ref 40–129)
ALT SERPL-CCNC: 41 U/L (ref 10–40)
ANION GAP SERPL CALCULATED.3IONS-SCNC: 14 MMOL/L (ref 3–16)
AST SERPL-CCNC: 34 U/L (ref 15–37)
BILIRUB SERPL-MCNC: 0.8 MG/DL (ref 0–1)
BUN SERPL-MCNC: 16 MG/DL (ref 7–20)
CALCIUM SERPL-MCNC: 9.5 MG/DL (ref 8.3–10.6)
CHLORIDE SERPL-SCNC: 102 MMOL/L (ref 99–110)
CHOLEST SERPL-MCNC: 206 MG/DL (ref 0–199)
CO2 SERPL-SCNC: 26 MMOL/L (ref 21–32)
CREAT SERPL-MCNC: 0.7 MG/DL (ref 0.6–1.2)
GFR SERPLBLD CREATININE-BSD FMLA CKD-EPI: >90 ML/MIN/{1.73_M2}
GLUCOSE SERPL-MCNC: 92 MG/DL (ref 70–99)
HDLC SERPL-MCNC: 58 MG/DL (ref 40–60)
LDLC SERPL CALC-MCNC: 126 MG/DL
POTASSIUM SERPL-SCNC: 4.4 MMOL/L (ref 3.5–5.1)
PROT SERPL-MCNC: 7.3 G/DL (ref 6.4–8.2)
SODIUM SERPL-SCNC: 142 MMOL/L (ref 136–145)
TRIGL SERPL-MCNC: 111 MG/DL (ref 0–150)
VLDLC SERPL CALC-MCNC: 22 MG/DL

## 2024-04-12 PROCEDURE — 99215 OFFICE O/P EST HI 40 MIN: CPT | Performed by: FAMILY MEDICINE

## 2024-04-12 RX ORDER — CYCLOBENZAPRINE HCL 10 MG
10 TABLET ORAL 3 TIMES DAILY PRN
Qty: 21 TABLET | Refills: 0 | Status: SHIPPED | OUTPATIENT
Start: 2024-04-12 | End: 2024-04-22

## 2024-04-12 SDOH — ECONOMIC STABILITY: INCOME INSECURITY: HOW HARD IS IT FOR YOU TO PAY FOR THE VERY BASICS LIKE FOOD, HOUSING, MEDICAL CARE, AND HEATING?: NOT HARD AT ALL

## 2024-04-12 SDOH — ECONOMIC STABILITY: FOOD INSECURITY: WITHIN THE PAST 12 MONTHS, THE FOOD YOU BOUGHT JUST DIDN'T LAST AND YOU DIDN'T HAVE MONEY TO GET MORE.: NEVER TRUE

## 2024-04-12 SDOH — ECONOMIC STABILITY: FOOD INSECURITY: WITHIN THE PAST 12 MONTHS, YOU WORRIED THAT YOUR FOOD WOULD RUN OUT BEFORE YOU GOT MONEY TO BUY MORE.: NEVER TRUE

## 2024-04-12 SDOH — ECONOMIC STABILITY: HOUSING INSECURITY
IN THE LAST 12 MONTHS, WAS THERE A TIME WHEN YOU DID NOT HAVE A STEADY PLACE TO SLEEP OR SLEPT IN A SHELTER (INCLUDING NOW)?: NO

## 2024-04-12 NOTE — PROGRESS NOTES
Carmencita Cotto presents for   Chief Complaint   Patient presents with    Hyperlipidemia     Pt states that she is here for a 6 month f/u, is fasting for bw    Anxiety    Back Pain     Pt states that she woke up yesterday with bad back pain on the lower left side. She states that she does not have nay pain or tingling down her legs though.        ASSESSMENT:   Diagnosis Orders   1. Hyperlipidemia, unspecified hyperlipidemia type, labs are pending we will see how Lipitor 20 mg is working Lipid Panel    Comprehensive Metabolic Panel      2. Anxiety, controlled continue Zoloft 50 mg       3. Osteoporosis, unspecified osteoporosis type, unspecified pathological fracture presence, DEXA scan was ordered 6 months ago has not been completed yet       4. Chronic constipation, controlled, patient currently asymptomatic       5. Strain of lumbar region, initial encounter, new issue will use Advil 600 800 mg 3 times a day with food and Flexeril 10 mg 3 times a day, ice.  If symptoms do not improve we can order physical therapy cyclobenzaprine (FLEXERIL) 10 MG tablet           Plan:  1)  Medication: Patient will start Advil 600 100 mg 3 times a day with food she can also take Flexeril 10 mg 3 times a day, other medications are working and tolerated, continue as listed  2)  Recheck in 6 months, sooner should new symptoms or problems arise.  3) LLR          SUBJECTIVE:  Carmencita Cotto is a 63 y.o. female who presents for evaluation of anxiety, chronic constipation, osteoporosis, and hyperlipidemia. She  denies any symptoms referable to her elevated blood pressure.   Specifically denies chest pain, palpitations, dyspnea, orthopnea, PND or peripheral edema or neuro sx.  No anorexia,  or leg cramps noted. Current medication regimen is as listed below. She denies any side effects of medication, and has been taking it regularly.   Lab Results   Component Value Date    LDLCALC 120 (H) 10/16/2023       Patient was scheduled for her

## 2024-04-15 DIAGNOSIS — E78.5 HYPERLIPIDEMIA, UNSPECIFIED HYPERLIPIDEMIA TYPE: ICD-10-CM

## 2024-04-15 RX ORDER — ATORVASTATIN CALCIUM 40 MG/1
TABLET, FILM COATED ORAL
Qty: 90 TABLET | Refills: 1 | Status: SHIPPED | OUTPATIENT
Start: 2024-04-15

## 2024-04-24 DIAGNOSIS — M81.0 OSTEOPOROSIS, UNSPECIFIED OSTEOPOROSIS TYPE, UNSPECIFIED PATHOLOGICAL FRACTURE PRESENCE: ICD-10-CM

## 2024-04-24 RX ORDER — ALENDRONATE SODIUM 70 MG/1
70 TABLET ORAL
Qty: 12 TABLET | Refills: 1 | Status: SHIPPED | OUTPATIENT
Start: 2024-04-24

## 2024-04-24 NOTE — TELEPHONE ENCOUNTER
Carmencita Cotto is requesting refill(s) fosamax  Last OV 4/12/24 (pertaining to medication)  LR 10/16/23 (per medication requested)  Next office visit scheduled or attempted Yes   If no, reason:  10/16/24

## 2024-04-25 RX ORDER — HYDROCORTISONE ACETATE PRAMOXINE HCL 2.5; 1 G/100G; G/100G
CREAM TOPICAL 3 TIMES DAILY
Qty: 60 G | Refills: 0 | Status: SHIPPED | OUTPATIENT
Start: 2024-04-25

## 2024-04-25 RX ORDER — DESOXIMETASONE 2.5 MG/G
CREAM TOPICAL
Qty: 30 G | Refills: 0 | Status: SHIPPED | OUTPATIENT
Start: 2024-04-25

## 2024-04-25 NOTE — TELEPHONE ENCOUNTER
Carmencita Cotto is requesting refill(s) topicort  Last OV 4/12/24 (pertaining to medication)  LR 10/16/23 (per medication requested)  Next office visit scheduled or attempted Yes   If no, reason:  10/16/24

## 2024-08-24 DIAGNOSIS — F41.9 ANXIETY: ICD-10-CM

## 2024-08-26 NOTE — TELEPHONE ENCOUNTER
Carmencita Cotto is requesting refill(s) sertraline  Last OV 4/12/24 (pertaining to medication)  LR 10/16/23 (per medication requested)  Next office visit scheduled or attempted Yes   If no, reason:  10/16/24

## 2024-10-16 ENCOUNTER — OFFICE VISIT (OUTPATIENT)
Dept: FAMILY MEDICINE CLINIC | Age: 64
End: 2024-10-16
Payer: COMMERCIAL

## 2024-10-16 VITALS
DIASTOLIC BLOOD PRESSURE: 64 MMHG | OXYGEN SATURATION: 98 % | HEART RATE: 77 BPM | BODY MASS INDEX: 21.16 KG/M2 | HEIGHT: 62 IN | SYSTOLIC BLOOD PRESSURE: 110 MMHG | WEIGHT: 115 LBS

## 2024-10-16 DIAGNOSIS — Z78.0 MENOPAUSE: ICD-10-CM

## 2024-10-16 DIAGNOSIS — M81.0 OSTEOPOROSIS, UNSPECIFIED OSTEOPOROSIS TYPE, UNSPECIFIED PATHOLOGICAL FRACTURE PRESENCE: ICD-10-CM

## 2024-10-16 DIAGNOSIS — F41.9 ANXIETY: ICD-10-CM

## 2024-10-16 DIAGNOSIS — E78.5 HYPERLIPIDEMIA, UNSPECIFIED HYPERLIPIDEMIA TYPE: Primary | ICD-10-CM

## 2024-10-16 LAB
ALBUMIN SERPL-MCNC: 4.6 G/DL (ref 3.4–5)
ALBUMIN/GLOB SERPL: 2.1 {RATIO} (ref 1.1–2.2)
ALP SERPL-CCNC: 65 U/L (ref 40–129)
ALT SERPL-CCNC: 50 U/L (ref 10–40)
ANION GAP SERPL CALCULATED.3IONS-SCNC: 11 MMOL/L (ref 3–16)
AST SERPL-CCNC: 40 U/L (ref 15–37)
BASOPHILS # BLD: 0 K/UL (ref 0–0.2)
BASOPHILS NFR BLD: 1.1 %
BILIRUB SERPL-MCNC: 1.2 MG/DL (ref 0–1)
BUN SERPL-MCNC: 13 MG/DL (ref 7–20)
CALCIUM SERPL-MCNC: 9.8 MG/DL (ref 8.3–10.6)
CHLORIDE SERPL-SCNC: 101 MMOL/L (ref 99–110)
CHOLEST SERPL-MCNC: 202 MG/DL (ref 0–199)
CO2 SERPL-SCNC: 27 MMOL/L (ref 21–32)
CREAT SERPL-MCNC: 0.7 MG/DL (ref 0.6–1.2)
DEPRECATED RDW RBC AUTO: 14.2 % (ref 12.4–15.4)
EOSINOPHIL # BLD: 0.1 K/UL (ref 0–0.6)
EOSINOPHIL NFR BLD: 2.8 %
GFR SERPLBLD CREATININE-BSD FMLA CKD-EPI: >90 ML/MIN/{1.73_M2}
GLUCOSE SERPL-MCNC: 85 MG/DL (ref 70–99)
HCT VFR BLD AUTO: 41.7 % (ref 36–48)
HDLC SERPL-MCNC: 58 MG/DL (ref 40–60)
HGB BLD-MCNC: 14.2 G/DL (ref 12–16)
LDLC SERPL CALC-MCNC: 116 MG/DL
LYMPHOCYTES # BLD: 1.3 K/UL (ref 1–5.1)
LYMPHOCYTES NFR BLD: 30.3 %
MCH RBC QN AUTO: 30.6 PG (ref 26–34)
MCHC RBC AUTO-ENTMCNC: 34.1 G/DL (ref 31–36)
MCV RBC AUTO: 89.8 FL (ref 80–100)
MONOCYTES # BLD: 0.3 K/UL (ref 0–1.3)
MONOCYTES NFR BLD: 7.2 %
NEUTROPHILS # BLD: 2.6 K/UL (ref 1.7–7.7)
NEUTROPHILS NFR BLD: 58.6 %
PLATELET # BLD AUTO: 276 K/UL (ref 135–450)
PMV BLD AUTO: 8.5 FL (ref 5–10.5)
POTASSIUM SERPL-SCNC: 4.1 MMOL/L (ref 3.5–5.1)
PROT SERPL-MCNC: 6.8 G/DL (ref 6.4–8.2)
RBC # BLD AUTO: 4.64 M/UL (ref 4–5.2)
SODIUM SERPL-SCNC: 139 MMOL/L (ref 136–145)
TRIGL SERPL-MCNC: 139 MG/DL (ref 0–150)
VLDLC SERPL CALC-MCNC: 28 MG/DL
WBC # BLD AUTO: 4.4 K/UL (ref 4–11)

## 2024-10-16 PROCEDURE — 99214 OFFICE O/P EST MOD 30 MIN: CPT | Performed by: FAMILY MEDICINE

## 2024-10-16 PROCEDURE — G2211 COMPLEX E/M VISIT ADD ON: HCPCS | Performed by: FAMILY MEDICINE

## 2024-10-16 RX ORDER — ALENDRONATE SODIUM 70 MG/1
70 TABLET ORAL
Qty: 12 TABLET | Refills: 1 | Status: SHIPPED | OUTPATIENT
Start: 2024-10-16

## 2024-10-16 RX ORDER — ATORVASTATIN CALCIUM 40 MG/1
TABLET, FILM COATED ORAL
Qty: 90 TABLET | Refills: 1 | Status: SHIPPED | OUTPATIENT
Start: 2024-10-16

## 2024-10-16 NOTE — PROGRESS NOTES
Carmencita Cotto presents for   Chief Complaint   Patient presents with    Hyperlipidemia     Pt states that she is here for a 6 month f/u, is fasting for bw     Labs Only        ASSESSMENT:   Diagnosis Orders   1. Hyperlipidemia, unspecified hyperlipidemia type, labs are pending May Lipitor 40 mg is work atorvastatin (LIPITOR) 40 MG tablet    Lipid Panel    Comprehensive Metabolic Panel    CBC with Auto Differential      2. Osteoporosis, unspecified osteoporosis type, unspecified pathological fracture presence, continue Fosamax 70 mg weekly, patient was given an order for another bone density to follow-up on progress alendronate (FOSAMAX) 70 MG tablet      3. Anxiety, controlled, continue Zoloft 50 mg daily sertraline (ZOLOFT) 50 MG tablet      4. Menopause  DEXA BONE DENSITY AXIAL SKELETON           Plan:  1)  Medication: continue current medication regimen unchanged, medications are working and tolerated, continue as listed  2)  Recheck in 6 months, sooner should new symptoms or problems arise.  3) LLR          SUBJECTIVE:  Carmencita Cotto is a 64 y.o. female who presents for evaluation of osteoporosis, anxiety and hyperlipidemia. She indicates that she is feeling well and denies any symptoms referable to her elevated blood pressure.   Specifically denies chest pain, palpitations, dyspnea, orthopnea, PND or peripheral edema or neuro sx.  No anorexia, arthralgia, or leg cramps noted. Current medication regimen is as listed below. She denies any side effects of medication, and has been taking it regularly.   Lab Results   Component Value Date    CHOL 206 (H) 04/12/2024    TRIG 111 04/12/2024    HDL 58 04/12/2024     (H) 04/12/2024    VLDL 22 04/12/2024        Overall, patient feeling well.  [birth year] [age].  She is fasting today we will see how Lipitor 40 mg is working.  Her anxiety is controlled on Zoloft 50 mg.  There are periods of time where she increases that to 100 mg for about a week or so then

## 2024-11-04 DIAGNOSIS — E78.5 HYPERLIPIDEMIA, UNSPECIFIED HYPERLIPIDEMIA TYPE: ICD-10-CM

## 2024-11-04 RX ORDER — ATORVASTATIN CALCIUM 40 MG/1
TABLET, FILM COATED ORAL
Qty: 90 TABLET | Refills: 1 | OUTPATIENT
Start: 2024-11-04

## 2024-11-04 NOTE — TELEPHONE ENCOUNTER
This medication was sent to the pharmacy on 10/16/24 as a 90 day supply with 1 refill. This refill is not needed.

## 2024-11-25 ENCOUNTER — PATIENT MESSAGE (OUTPATIENT)
Dept: FAMILY MEDICINE CLINIC | Age: 64
End: 2024-11-25

## 2024-11-26 RX ORDER — ROSUVASTATIN CALCIUM 40 MG/1
40 TABLET, COATED ORAL DAILY
Qty: 30 TABLET | Refills: 5 | Status: SHIPPED | OUTPATIENT
Start: 2024-11-26

## 2024-11-27 ENCOUNTER — HOSPITAL ENCOUNTER (OUTPATIENT)
Dept: WOMENS IMAGING | Age: 64
Discharge: HOME | End: 2024-11-27
Payer: COMMERCIAL

## 2024-11-27 VITALS — HEIGHT: 62 IN | BODY MASS INDEX: 21.53 KG/M2 | WEIGHT: 117 LBS

## 2024-11-27 DIAGNOSIS — Z78.0 MENOPAUSE: ICD-10-CM

## 2024-11-27 DIAGNOSIS — Z12.31 VISIT FOR SCREENING MAMMOGRAM: ICD-10-CM

## 2024-11-27 PROCEDURE — 77063 BREAST TOMOSYNTHESIS BI: CPT

## 2024-11-27 PROCEDURE — 77080 DXA BONE DENSITY AXIAL: CPT

## 2025-01-08 ENCOUNTER — HOSPITAL ENCOUNTER (OUTPATIENT)
Age: 65
Discharge: HOME | End: 2025-01-08
Payer: COMMERCIAL

## 2025-01-08 ENCOUNTER — HOSPITAL ENCOUNTER (OUTPATIENT)
Dept: GENERAL RADIOLOGY | Age: 65
Discharge: HOME | End: 2025-01-08
Payer: COMMERCIAL

## 2025-01-08 ENCOUNTER — OFFICE VISIT (OUTPATIENT)
Dept: FAMILY MEDICINE CLINIC | Age: 65
End: 2025-01-08

## 2025-01-08 VITALS
WEIGHT: 114.4 LBS | HEIGHT: 62 IN | HEART RATE: 73 BPM | OXYGEN SATURATION: 98 % | DIASTOLIC BLOOD PRESSURE: 64 MMHG | BODY MASS INDEX: 21.05 KG/M2 | SYSTOLIC BLOOD PRESSURE: 118 MMHG

## 2025-01-08 DIAGNOSIS — J18.9 PNEUMONIA OF LEFT LUNG DUE TO INFECTIOUS ORGANISM, UNSPECIFIED PART OF LUNG: Primary | ICD-10-CM

## 2025-01-08 DIAGNOSIS — J18.9 PNEUMONIA OF LEFT LUNG DUE TO INFECTIOUS ORGANISM, UNSPECIFIED PART OF LUNG: ICD-10-CM

## 2025-01-08 PROCEDURE — 71046 X-RAY EXAM CHEST 2 VIEWS: CPT

## 2025-01-08 RX ORDER — AZITHROMYCIN 250 MG/1
250 TABLET, FILM COATED ORAL DAILY
Qty: 1 PACKET | Refills: 0 | Status: SHIPPED | OUTPATIENT
Start: 2025-01-08

## 2025-01-08 SDOH — ECONOMIC STABILITY: FOOD INSECURITY: WITHIN THE PAST 12 MONTHS, THE FOOD YOU BOUGHT JUST DIDN'T LAST AND YOU DIDN'T HAVE MONEY TO GET MORE.: NEVER TRUE

## 2025-01-08 SDOH — ECONOMIC STABILITY: FOOD INSECURITY: WITHIN THE PAST 12 MONTHS, YOU WORRIED THAT YOUR FOOD WOULD RUN OUT BEFORE YOU GOT MONEY TO BUY MORE.: NEVER TRUE

## 2025-01-08 SDOH — ECONOMIC STABILITY: INCOME INSECURITY: IN THE LAST 12 MONTHS, WAS THERE A TIME WHEN YOU WERE NOT ABLE TO PAY THE MORTGAGE OR RENT ON TIME?: NO

## 2025-01-08 SDOH — ECONOMIC STABILITY: TRANSPORTATION INSECURITY
IN THE PAST 12 MONTHS, HAS LACK OF TRANSPORTATION KEPT YOU FROM MEETINGS, WORK, OR FROM GETTING THINGS NEEDED FOR DAILY LIVING?: NO

## 2025-01-08 SDOH — ECONOMIC STABILITY: TRANSPORTATION INSECURITY
IN THE PAST 12 MONTHS, HAS THE LACK OF TRANSPORTATION KEPT YOU FROM MEDICAL APPOINTMENTS OR FROM GETTING MEDICATIONS?: NO

## 2025-01-08 ASSESSMENT — PATIENT HEALTH QUESTIONNAIRE - PHQ9
2. FEELING DOWN, DEPRESSED OR HOPELESS: SEVERAL DAYS
1. LITTLE INTEREST OR PLEASURE IN DOING THINGS: NOT AT ALL
SUM OF ALL RESPONSES TO PHQ QUESTIONS 1-9: 1
1. LITTLE INTEREST OR PLEASURE IN DOING THINGS: NOT AT ALL
SUM OF ALL RESPONSES TO PHQ9 QUESTIONS 1 & 2: 1
SUM OF ALL RESPONSES TO PHQ QUESTIONS 1-9: 1
2. FEELING DOWN, DEPRESSED OR HOPELESS: SEVERAL DAYS
SUM OF ALL RESPONSES TO PHQ9 QUESTIONS 1 & 2: 1

## 2025-01-08 ASSESSMENT — ENCOUNTER SYMPTOMS
COUGH: 1
SINUS PAIN: 0
SINUS PRESSURE: 0
SHORTNESS OF BREATH: 1
WHEEZING: 1

## 2025-01-08 NOTE — PROGRESS NOTES
surgical history, family history, medications and allergies were all reviewed and updated as appropriate today.      Review of Systems   Constitutional:  Positive for fatigue. Negative for fever.   HENT:  Negative for sinus pressure and sinus pain.    Respiratory:  Positive for cough, shortness of breath and wheezing.         Physical Exam  Vitals reviewed.   Constitutional:       General: She is not in acute distress.     Appearance: She is not toxic-appearing.   HENT:      Right Ear: Tympanic membrane normal.      Left Ear: Tympanic membrane normal.   Eyes:      Conjunctiva/sclera: Conjunctivae normal.   Cardiovascular:      Rate and Rhythm: Normal rate and regular rhythm.      Heart sounds: Normal heart sounds.   Pulmonary:      Effort: Pulmonary effort is normal. No respiratory distress.      Breath sounds: Rhonchi present.      Comments: Coarse breath sounds in the left lung region  Neurological:      Mental Status: She is alert.           Vitals:    01/08/25 1336   BP: 118/64   Pulse: 73   SpO2: 98%   Weight: 51.9 kg (114 lb 6.4 oz)   Height: 1.575 m (5' 2\")       Assessment/Plan:   Carmencita \"Marina\" was seen today for cough.    Diagnoses and all orders for this visit:    Pneumonia of left lung due to infectious organism, unspecified part of lung, will cover for atypical bacteria since she did not respond to Augmentin.  Patient was sent for chest x-ray.  Clinically she does have a pneumonia she will follow-up if not better we will contact her once we get the chest x-ray report.  If her chest x-ray is abnormal, we will need to follow up her chest x-ray until normal  -     azithromycin (ZITHROMAX Z-HAYLEY) 250 MG tablet; Take 1 tablet by mouth daily 2 po today, then 1 po daily for 4 days  -     XR CHEST STANDARD (2 VW); Future

## 2025-04-13 DIAGNOSIS — M81.0 OSTEOPOROSIS, UNSPECIFIED OSTEOPOROSIS TYPE, UNSPECIFIED PATHOLOGICAL FRACTURE PRESENCE: ICD-10-CM

## 2025-04-14 RX ORDER — ALENDRONATE SODIUM 70 MG/1
70 TABLET ORAL
Qty: 12 TABLET | Refills: 1 | Status: SHIPPED | OUTPATIENT
Start: 2025-04-14 | End: 2025-04-16 | Stop reason: SDUPTHER

## 2025-04-14 NOTE — TELEPHONE ENCOUNTER
Carmencita Cotto is requesting refill(s) alendronate  Last OV 10/16/24 (pertaining to medication)  LR 10/16/24 (per medication requested)  Next office visit scheduled or attempted Yes   If no, reason:  4/16/25

## 2025-04-16 ENCOUNTER — OFFICE VISIT (OUTPATIENT)
Dept: FAMILY MEDICINE CLINIC | Age: 65
End: 2025-04-16

## 2025-04-16 VITALS
HEART RATE: 78 BPM | SYSTOLIC BLOOD PRESSURE: 100 MMHG | HEIGHT: 62 IN | BODY MASS INDEX: 21.09 KG/M2 | DIASTOLIC BLOOD PRESSURE: 64 MMHG | OXYGEN SATURATION: 98 % | WEIGHT: 114.6 LBS

## 2025-04-16 DIAGNOSIS — F41.9 ANXIETY: ICD-10-CM

## 2025-04-16 DIAGNOSIS — M81.0 OSTEOPOROSIS, UNSPECIFIED OSTEOPOROSIS TYPE, UNSPECIFIED PATHOLOGICAL FRACTURE PRESENCE: ICD-10-CM

## 2025-04-16 DIAGNOSIS — E78.5 HYPERLIPIDEMIA, UNSPECIFIED HYPERLIPIDEMIA TYPE: Primary | ICD-10-CM

## 2025-04-16 DIAGNOSIS — Z23 NEED FOR VACCINATION FOR PNEUMOCOCCUS: ICD-10-CM

## 2025-04-16 LAB
ALBUMIN SERPL-MCNC: 4.5 G/DL (ref 3.4–5)
ALBUMIN/GLOB SERPL: 1.9 {RATIO} (ref 1.1–2.2)
ALP SERPL-CCNC: 63 U/L (ref 40–129)
ALT SERPL-CCNC: 54 U/L (ref 10–40)
ANION GAP SERPL CALCULATED.3IONS-SCNC: 11 MMOL/L (ref 3–16)
AST SERPL-CCNC: 45 U/L (ref 15–37)
BILIRUB SERPL-MCNC: 0.8 MG/DL (ref 0–1)
BUN SERPL-MCNC: 9 MG/DL (ref 7–20)
CALCIUM SERPL-MCNC: 9.4 MG/DL (ref 8.3–10.6)
CHLORIDE SERPL-SCNC: 103 MMOL/L (ref 99–110)
CHOLEST SERPL-MCNC: 151 MG/DL (ref 0–199)
CO2 SERPL-SCNC: 25 MMOL/L (ref 21–32)
CREAT SERPL-MCNC: 0.6 MG/DL (ref 0.6–1.2)
GFR SERPLBLD CREATININE-BSD FMLA CKD-EPI: >90 ML/MIN/{1.73_M2}
GLUCOSE SERPL-MCNC: 91 MG/DL (ref 70–99)
HDLC SERPL-MCNC: 57 MG/DL (ref 40–60)
LDLC SERPL CALC-MCNC: 81 MG/DL
POTASSIUM SERPL-SCNC: 4 MMOL/L (ref 3.5–5.1)
PROT SERPL-MCNC: 6.9 G/DL (ref 6.4–8.2)
SODIUM SERPL-SCNC: 139 MMOL/L (ref 136–145)
TRIGL SERPL-MCNC: 65 MG/DL (ref 0–150)
VLDLC SERPL CALC-MCNC: 13 MG/DL

## 2025-04-16 RX ORDER — SERTRALINE HYDROCHLORIDE 100 MG/1
100 TABLET, FILM COATED ORAL DAILY
Qty: 90 TABLET | Refills: 1 | Status: SHIPPED | OUTPATIENT
Start: 2025-04-16

## 2025-04-16 RX ORDER — ROSUVASTATIN CALCIUM 40 MG/1
40 TABLET, COATED ORAL DAILY
Qty: 90 TABLET | Refills: 1 | Status: SHIPPED | OUTPATIENT
Start: 2025-04-16

## 2025-04-16 RX ORDER — SERTRALINE HYDROCHLORIDE 100 MG/1
100 TABLET, FILM COATED ORAL DAILY
COMMUNITY
End: 2025-04-16 | Stop reason: SDUPTHER

## 2025-04-16 RX ORDER — ALENDRONATE SODIUM 70 MG/1
70 TABLET ORAL
Qty: 12 TABLET | Refills: 1 | Status: SHIPPED | OUTPATIENT
Start: 2025-04-16

## 2025-04-16 NOTE — PROGRESS NOTES
Carmencita Cotto presents for   Chief Complaint   Patient presents with    Hyperlipidemia     Pt states that she is  here for a 6 month f/u, is fasting for bw         ASSESSMENT:   Diagnosis Orders   1. Hyperlipidemia, unspecified hyperlipidemia type, labs are pending we will see how Crestor 40 mg is working rosuvastatin (CRESTOR) 40 MG tablet    Lipid Panel    Comprehensive Metabolic Panel      2. Osteoporosis, unspecified osteoporosis type, unspecified pathological fracture presence, uncertain control, continue Fosamax 70 mg weekly alendronate (FOSAMAX) 70 MG tablet      3. Anxiety, well-controlled continue sertraline 100 mg daily sertraline (ZOLOFT) 100 MG tablet      4. Need for vaccination for pneumococcus, this completes her series Pneumococcal, PCV20, PREVNAR 20, (age 6w+), IM, PF           Plan:  1)  Medication: continue current medication regimen unchanged, medications are working and tolerated, continue as listed  2)  Recheck in 6 months, sooner should new symptoms or problems arise.  3) LLR          SUBJECTIVE:  Carmencita Cotto is a 64 y.o. female who presents for evaluation of osteoporosis, anxiety and hyperlipidemia. She indicates that she is feeling well and denies any symptoms referable to her elevated blood lipids.   Specifically denies chest pain, palpitations, dyspnea, orthopnea, PND or peripheral edema or neuro sx.  No anorexia, arthralgia, or leg cramps noted. Current medication regimen is as listed below. She denies any side effects of medication, and has been taking it regularly.   Lab Results   Component Value Date    CHOL 202 (H) 10/16/2024    TRIG 139 10/16/2024    HDL 58 10/16/2024     (H) 10/16/2024    VLDL 28 10/16/2024        Overall, patient feeling well no acute issues.  She is getting ready to leave for a [place].  Her anxiety has been well-controlled with sertraline 100 mg, no need to make any changes.  She is fasting today we will see how Crestor 40 mg is working.

## 2025-04-21 ENCOUNTER — RESULTS FOLLOW-UP (OUTPATIENT)
Dept: FAMILY MEDICINE CLINIC | Age: 65
End: 2025-04-21

## 2025-04-21 DIAGNOSIS — R79.89 ELEVATED LFTS: Primary | ICD-10-CM

## 2025-05-05 ENCOUNTER — HOSPITAL ENCOUNTER (OUTPATIENT)
Dept: ULTRASOUND IMAGING | Age: 65
Discharge: HOME | End: 2025-05-05
Payer: COMMERCIAL

## 2025-05-05 DIAGNOSIS — R79.89 ELEVATED LFTS: ICD-10-CM

## 2025-05-05 PROCEDURE — 76705 ECHO EXAM OF ABDOMEN: CPT

## 2025-05-09 ENCOUNTER — RESULTS FOLLOW-UP (OUTPATIENT)
Dept: FAMILY MEDICINE CLINIC | Age: 65
End: 2025-05-09

## 2025-05-09 DIAGNOSIS — R79.89 ELEVATED LFTS: Primary | ICD-10-CM

## 2025-06-11 DIAGNOSIS — E78.5 HYPERLIPIDEMIA, UNSPECIFIED HYPERLIPIDEMIA TYPE: ICD-10-CM

## 2025-06-11 RX ORDER — ROSUVASTATIN CALCIUM 40 MG/1
40 TABLET, COATED ORAL DAILY
Qty: 30 TABLET | Refills: 0 | OUTPATIENT
Start: 2025-06-11

## 2025-06-11 NOTE — TELEPHONE ENCOUNTER
This medication was sent to the pharmacy on 4/16/25 as a 90 day supply with 1 refill. This refill is not needed.

## 2025-08-22 DIAGNOSIS — R79.89 ELEVATED LFTS: ICD-10-CM

## 2025-08-22 LAB
ALBUMIN SERPL-MCNC: 4.4 G/DL (ref 3.4–5)
ALP SERPL-CCNC: 58 U/L (ref 40–129)
ALT SERPL-CCNC: 36 U/L (ref 10–40)
AST SERPL-CCNC: 28 U/L (ref 15–37)
BILIRUB DIRECT SERPL-MCNC: 0.3 MG/DL (ref 0–0.3)
BILIRUB INDIRECT SERPL-MCNC: 0.4 MG/DL (ref 0–1)
BILIRUB SERPL-MCNC: 0.7 MG/DL (ref 0–1)
PROT SERPL-MCNC: 6.7 G/DL (ref 6.4–8.2)

## (undated) DEVICE — FORCEPS BX 240CM 2.4MM L NDL RAD JAW 4 M00513334